# Patient Record
Sex: FEMALE | Race: WHITE | NOT HISPANIC OR LATINO | Employment: FULL TIME | URBAN - METROPOLITAN AREA
[De-identification: names, ages, dates, MRNs, and addresses within clinical notes are randomized per-mention and may not be internally consistent; named-entity substitution may affect disease eponyms.]

---

## 2018-11-30 ENCOUNTER — OFFICE VISIT (OUTPATIENT)
Dept: URGENT CARE | Facility: URGENT CARE | Age: 23
End: 2018-11-30
Payer: COMMERCIAL

## 2018-11-30 VITALS
DIASTOLIC BLOOD PRESSURE: 62 MMHG | HEIGHT: 62 IN | SYSTOLIC BLOOD PRESSURE: 116 MMHG | WEIGHT: 135 LBS | BODY MASS INDEX: 24.84 KG/M2 | RESPIRATION RATE: 16 BRPM | TEMPERATURE: 98.3 F | HEART RATE: 88 BPM | OXYGEN SATURATION: 100 %

## 2018-11-30 DIAGNOSIS — T78.40XD ALLERGIC STATE, SUBSEQUENT ENCOUNTER: Primary | ICD-10-CM

## 2018-11-30 PROCEDURE — 99203 OFFICE O/P NEW LOW 30 MIN: CPT | Performed by: PEDIATRICS

## 2018-11-30 RX ORDER — ALBUTEROL SULFATE 90 UG/1
2 AEROSOL, METERED RESPIRATORY (INHALATION) EVERY 6 HOURS
COMMUNITY

## 2018-11-30 RX ORDER — FLUTICASONE PROPIONATE 50 MCG
1 SPRAY, SUSPENSION (ML) NASAL DAILY
COMMUNITY

## 2018-11-30 RX ORDER — AZELASTINE 1 MG/ML
1 SPRAY, METERED NASAL 2 TIMES DAILY
COMMUNITY

## 2018-11-30 RX ORDER — PREDNISONE 20 MG/1
40 TABLET ORAL DAILY
Qty: 6 TABLET | Refills: 0 | Status: SHIPPED | OUTPATIENT
Start: 2018-11-30 | End: 2018-12-03

## 2018-11-30 NOTE — MR AVS SNAPSHOT
"              After Visit Summary   2018    Dorita Proctor    MRN: [id number]           Patient Information     Date Of Birth          1995        Visit Information        Provider Department      2018 7:35 PM Skip Matthew MD [facility]        Today's Diagnoses     Allergic state, subsequent encounter    -  1       Follow-ups after your visit        Who to contact     If you have questions or need follow up information about today's clinic visit or your schedule please contact [facility] directly at [phone].  Normal or non-critical lab and imaging results will be communicated to you by TruQChart, letter or phone within 4 business days after the clinic has received the results. If you do not hear from us within 7 days, please contact the clinic through [employer] or phone. If you have a critical or abnormal lab result, we will notify you by phone as soon as possible.  Submit refill requests through [employer] or call your pharmacy and they will forward the refill request to us. Please allow 3 business days for your refill to be completed.          Additional Information About Your Visit        MyChart Information     [employer] lets you send messages to your doctor, view your test results, renew your prescriptions, schedule appointments and more. To sign up, go to [email or web address] . Click on \"Log in\" on the left side of the screen, which will take you to the Welcome page. Then click on \"Sign up Now\" on the right side of the page.     You will be asked to enter the access code listed below, as well as some personal information. Please follow the directions to create your username and password.     Your access code is: [email or web address]  Expires: 2019  8:52 PM     Your access code will  in 90 days. If you need help or a new code, please call your [place] clinic or [phone].        Care EveryWhere ID     This is your Care EveryWhere ID. This " "could be used by other organizations to access your [facility] medical records  [id number]        Your Vitals Were     Pulse Temperature Respirations Height Pulse Oximetry BMI (Body Mass Index)    88 98.3  F (36.8  C) (Oral) 16 5' 2\" (1.575 m) 100% 24.69 kg/m2       Blood Pressure from Last 3 Encounters:   11/30/18 116/62    Weight from Last 3 Encounters:   11/30/18 135 lb (61.2 kg)              Today, you had the following     No orders found for display         Today's Medication Changes          These changes are accurate as of 11/30/18  8:52 PM.  If you have any questions, ask your nurse or doctor.               Start taking these medicines.        Dose/Directions    predniSONE 20 MG tablet   Commonly known as:  DELTASONE   Used for:  Allergic state, subsequent encounter   Started by:  Skip Matthew MD        Dose:  40 mg   Take 2 tablets (40 mg) by mouth daily for 3 days   Quantity:  6 tablet   Refills:  0       ranitidine 150 MG tablet   Commonly known as:  ZANTAC   Used for:  Allergic state, subsequent encounter   Started by:  Skip Matthew MD        Dose:  150 mg   Take 1 tablet (150 mg) by mouth 2 times daily   Quantity:  60 tablet   Refills:  0            Where to get your medicines      These medications were sent to [facility] [address] [place] [id number]     Phone:  [phone]     predniSONE 20 MG tablet    ranitidine 150 MG tablet                Primary Care Provider Fax #    Physician No Ref-Primary [phone]       No address on file        Equal Access to Services     [facility] CHER AH: Hadii ever murrayo Soyennyali, waaxda luqadaha, qaybta kaalmada adeegyada, maribell coello. So [facility] [phone].    ATENCIÓN: Si habla español, tiene a benson disposición servicios gratuitos de asistencia lingüística. Llame al [phone].    We comply with applicable federal civil rights laws and [place] laws. We do " not discriminate on the basis of race, color, national origin, age, disability, sex, sexual orientation, or gender identity.            Thank you!     Thank you for choosing [facility]  for your care. Our goal is always to provide you with excellent care. Hearing back from our patients is one way we can continue to improve our services. Please take a few minutes to complete the written survey that you may receive in the mail after your visit with us. Thank you!             Your Updated Medication List - Protect others around you: Learn how to safely use, store and throw away your medicines at www.disposemymeds.org.          This list is accurate as of 11/30/18  8:52 PM.  Always use your most recent med list.                   Brand Name Dispense Instructions for use Diagnosis    albuterol 108 (90 Base) MCG/ACT inhaler    PROAIR HFA/PROVENTIL HFA/VENTOLIN HFA     Inhale 2 puffs into the lungs every 6 hours        azelastine 0.1 % nasal spray    ASTELIN     Spray 1 spray into both nostrils 2 times daily        fluticasone 50 MCG/ACT nasal spray    FLONASE     Spray 1 spray into both nostrils daily        predniSONE 20 MG tablet    DELTASONE    6 tablet    Take 2 tablets (40 mg) by mouth daily for 3 days    Allergic state, subsequent encounter       ranitidine 150 MG tablet    ZANTAC    60 tablet    Take 1 tablet (150 mg) by mouth 2 times daily    Allergic state, subsequent encounter       SINGULAIR PO           VITAMIN D (CHOLECALCIFEROL) PO      Take by mouth daily

## 2018-12-01 NOTE — NURSING NOTE
Gave Benadrylk 25 mg po at 7:55PM per verbal order Dr. Matthew (pt had already taken 25 mg at 7PM). - Cait Siegel RN

## 2018-12-01 NOTE — PROGRESS NOTES
"SUBJECTIVE:  Dorita Proctor is a 23 year old female who presents to the clinic today for a rash.  Onset of rash was 1 hour(s) ago.   Rash is sudden onset.  Location of the rash: generalized.  Quality/symptoms of rash: itching and red   Symptoms are severe and rash seems to be worsening.  Previous history of a similar rash? Yes: history of allergic reactoins  Recent exposure history: food from nearby restaurant    Associated symptoms include: wheezing, shortness of breath and nausea.    No past medical history on file.  Current Outpatient Prescriptions   Medication Sig Dispense Refill     albuterol (PROAIR HFA/PROVENTIL HFA/VENTOLIN HFA) 108 (90 Base) MCG/ACT inhaler Inhale 2 puffs into the lungs every 6 hours       azelastine (ASTELIN) 0.1 % nasal spray Spray 1 spray into both nostrils 2 times daily       fluticasone (FLONASE) 50 MCG/ACT nasal spray Spray 1 spray into both nostrils daily       Montelukast Sodium (SINGULAIR PO)        predniSONE (DELTASONE) 20 MG tablet Take 2 tablets (40 mg) by mouth daily for 3 days 6 tablet 0     ranitidine (ZANTAC) 150 MG tablet Take 1 tablet (150 mg) by mouth 2 times daily 60 tablet 0     VITAMIN D, CHOLECALCIFEROL, PO Take by mouth daily       Social History   Substance Use Topics     Smoking status: Never Smoker     Smokeless tobacco: Never Used     Alcohol use Not on file       ROS:  CONSTITUTIONAL:NEGATIVE for fever, chills, change in weight  EYES: NEGATIVE for vision changes or irritation  CV: NEGATIVE for chest pain, palpitations or peripheral edema  MUSCULOSKELETAL: NEGATIVE for significant arthralgias or myalgia  NEURO: NEGATIVE for weakness, dizziness or paresthesias    EXAM:   /62  Pulse 88  Temp 98.3  F (36.8  C) (Oral)  Resp 16  Ht 5' 2\" (1.575 m)  Wt 135 lb (61.2 kg)  SpO2 100%  BMI 24.69 kg/m2  GENERAL: alert, no acute distress.  SKIN: Rash description:    Distribution: generalized  Location: generalized    Color: red,  Lesion type: wheals, blotchy with " warmth and excoriation  GENERAL APPEARANCE: healthy, alert and no distress  EYES: EOMI,  PERRL, conjunctiva clear  NECK: supple, non-tender to palpation, no adenopathy noted  RESP: lungs clear to auscultation - no rales, rhonchi or wheezes  CV: regular rates and rhythm, normal S1 S2, no murmur noted  NEURO: mild fine shaking, generalized    ASSESSMENT:  Hives, allergic reaction    PLAN:  Benadryl and prednisone given in clinic. Prescribed ranitidine and prednisone. Counseled on when to use the Epi pen.

## 2018-12-05 ENCOUNTER — RECORDS - HEALTHEAST (OUTPATIENT)
Dept: ADMINISTRATIVE | Facility: OTHER | Age: 23
End: 2018-12-05

## 2019-04-19 ENCOUNTER — RECORDS - HEALTHEAST (OUTPATIENT)
Dept: ADMINISTRATIVE | Facility: OTHER | Age: 24
End: 2019-04-19

## 2020-04-17 ENCOUNTER — RECORDS - HEALTHEAST (OUTPATIENT)
Dept: ADMINISTRATIVE | Facility: OTHER | Age: 25
End: 2020-04-17

## 2020-10-27 ENCOUNTER — COMMUNICATION - HEALTHEAST (OUTPATIENT)
Dept: SCHEDULING | Facility: CLINIC | Age: 25
End: 2020-10-27

## 2020-10-27 ENCOUNTER — OFFICE VISIT - HEALTHEAST (OUTPATIENT)
Dept: FAMILY MEDICINE | Facility: CLINIC | Age: 25
End: 2020-10-27

## 2020-10-27 DIAGNOSIS — Z20.822 COVID-19 RULED OUT: ICD-10-CM

## 2020-10-30 ENCOUNTER — AMBULATORY - HEALTHEAST (OUTPATIENT)
Dept: FAMILY MEDICINE | Facility: CLINIC | Age: 25
End: 2020-10-30

## 2020-10-30 DIAGNOSIS — Z20.822 COVID-19 RULED OUT: ICD-10-CM

## 2020-11-01 ENCOUNTER — COMMUNICATION - HEALTHEAST (OUTPATIENT)
Dept: SCHEDULING | Facility: CLINIC | Age: 25
End: 2020-11-01

## 2021-02-05 ENCOUNTER — COMMUNICATION - HEALTHEAST (OUTPATIENT)
Dept: SCHEDULING | Facility: CLINIC | Age: 26
End: 2021-02-05

## 2021-03-12 ENCOUNTER — OFFICE VISIT - HEALTHEAST (OUTPATIENT)
Dept: FAMILY MEDICINE | Facility: CLINIC | Age: 26
End: 2021-03-12

## 2021-03-12 DIAGNOSIS — Z12.4 SCREENING FOR CERVICAL CANCER: ICD-10-CM

## 2021-03-12 DIAGNOSIS — Z13.9 SCREENING FOR CONDITION: ICD-10-CM

## 2021-03-12 DIAGNOSIS — R35.0 URINARY FREQUENCY: ICD-10-CM

## 2021-03-12 DIAGNOSIS — J45.40 MODERATE PERSISTENT ASTHMA WITHOUT COMPLICATION: ICD-10-CM

## 2021-03-12 DIAGNOSIS — Z00.00 ROUTINE GENERAL MEDICAL EXAMINATION AT A HEALTH CARE FACILITY: ICD-10-CM

## 2021-03-12 DIAGNOSIS — Z76.89 ENCOUNTER TO ESTABLISH CARE: ICD-10-CM

## 2021-03-12 LAB
ALBUMIN UR-MCNC: NEGATIVE G/DL
APPEARANCE UR: CLEAR
BACTERIA #/AREA URNS HPF: ABNORMAL /[HPF]
BILIRUB UR QL STRIP: NEGATIVE
CHOLEST SERPL-MCNC: 182 MG/DL
COLOR UR AUTO: YELLOW
ERYTHROCYTE [DISTWIDTH] IN BLOOD BY AUTOMATED COUNT: 13 % (ref 11–14.5)
FASTING STATUS PATIENT QL REPORTED: NO
GLUCOSE UR STRIP-MCNC: NEGATIVE MG/DL
HBA1C MFR BLD: 5.3 %
HCT VFR BLD AUTO: 38.6 % (ref 35–47)
HDLC SERPL-MCNC: 73 MG/DL
HGB BLD-MCNC: 12.6 G/DL (ref 12–16)
HGB UR QL STRIP: ABNORMAL
HIV 1+2 AB+HIV1 P24 AG SERPL QL IA: NEGATIVE
KETONES UR STRIP-MCNC: NEGATIVE MG/DL
LDLC SERPL CALC-MCNC: 99 MG/DL
LEUKOCYTE ESTERASE UR QL STRIP: NEGATIVE
MCH RBC QN AUTO: 26.6 PG (ref 27–34)
MCHC RBC AUTO-ENTMCNC: 32.6 G/DL (ref 32–36)
MCV RBC AUTO: 81 FL (ref 80–100)
NITRATE UR QL: NEGATIVE
PH UR STRIP: 6 [PH] (ref 5–8)
PLATELET # BLD AUTO: 273 THOU/UL (ref 140–440)
PMV BLD AUTO: 8.2 FL (ref 7–10)
RBC # BLD AUTO: 4.74 MILL/UL (ref 3.8–5.4)
RBC #/AREA URNS AUTO: ABNORMAL HPF
SP GR UR STRIP: 1.01 (ref 1–1.03)
SQUAMOUS #/AREA URNS AUTO: ABNORMAL LPF
TRIGL SERPL-MCNC: 51 MG/DL
UROBILINOGEN UR STRIP-ACNC: ABNORMAL
WBC #/AREA URNS AUTO: ABNORMAL HPF
WBC: 6.9 THOU/UL (ref 4–11)

## 2021-03-12 ASSESSMENT — MIFFLIN-ST. JEOR: SCORE: 1297

## 2021-03-15 LAB
BKR LAB AP ABNORMAL BLEEDING: NO
BKR LAB AP BIRTH CONTROL/HORMONES: NORMAL
BKR LAB AP CERVICAL APPEARANCE: NORMAL
BKR LAB AP GYN ADEQUACY: NORMAL
BKR LAB AP GYN INTERPRETATION: NORMAL
BKR LAB AP HPV REFLEX: NORMAL
BKR LAB AP LMP: NORMAL
BKR LAB AP PATIENT STATUS: NORMAL
BKR LAB AP PREVIOUS ABNORMAL: NORMAL
BKR LAB AP PREVIOUS NORMAL: NORMAL
HCV AB SERPL QL IA: NEGATIVE
HIGH RISK?: NO
PATH REPORT.COMMENTS IMP SPEC: NORMAL
RESULT FLAG (HE HISTORICAL CONVERSION): NORMAL

## 2021-03-16 LAB
C TRACH DNA SPEC QL PROBE+SIG AMP: NEGATIVE
N GONORRHOEA DNA SPEC QL NAA+PROBE: NEGATIVE

## 2021-04-19 ENCOUNTER — RECORDS - HEALTHEAST (OUTPATIENT)
Dept: ADMINISTRATIVE | Facility: OTHER | Age: 26
End: 2021-04-19

## 2021-04-20 ENCOUNTER — COMMUNICATION - HEALTHEAST (OUTPATIENT)
Dept: SCHEDULING | Facility: CLINIC | Age: 26
End: 2021-04-20

## 2021-04-30 ENCOUNTER — OFFICE VISIT - HEALTHEAST (OUTPATIENT)
Dept: FAMILY MEDICINE | Facility: CLINIC | Age: 26
End: 2021-04-30

## 2021-04-30 ENCOUNTER — RECORDS - HEALTHEAST (OUTPATIENT)
Dept: GENERAL RADIOLOGY | Facility: CLINIC | Age: 26
End: 2021-04-30

## 2021-04-30 DIAGNOSIS — M79.644 THUMB PAIN, RIGHT: ICD-10-CM

## 2021-04-30 DIAGNOSIS — M79.644 PAIN IN RIGHT FINGER(S): ICD-10-CM

## 2021-04-30 DIAGNOSIS — Z30.46 ENCOUNTER FOR REMOVAL AND REINSERTION OF NEXPLANON: ICD-10-CM

## 2021-04-30 LAB — HCG UR QL: NEGATIVE

## 2021-05-20 ENCOUNTER — AMBULATORY - HEALTHEAST (OUTPATIENT)
Dept: NURSING | Facility: CLINIC | Age: 26
End: 2021-05-20

## 2021-05-20 DIAGNOSIS — Z00.00 ROUTINE GENERAL MEDICAL EXAMINATION AT A HEALTH CARE FACILITY: ICD-10-CM

## 2021-05-28 ASSESSMENT — ASTHMA QUESTIONNAIRES: ACT_TOTALSCORE: 21

## 2021-06-05 VITALS
HEART RATE: 72 BPM | WEIGHT: 133.9 LBS | BODY MASS INDEX: 24.49 KG/M2 | SYSTOLIC BLOOD PRESSURE: 102 MMHG | DIASTOLIC BLOOD PRESSURE: 78 MMHG

## 2021-06-05 VITALS
HEIGHT: 62 IN | SYSTOLIC BLOOD PRESSURE: 122 MMHG | HEART RATE: 64 BPM | BODY MASS INDEX: 24.29 KG/M2 | DIASTOLIC BLOOD PRESSURE: 82 MMHG | WEIGHT: 132 LBS

## 2021-06-12 NOTE — TELEPHONE ENCOUNTER
FNA triage call : Hx of Asthma .   Presenting problem :  Pt called. Exposure to covid last on 10/23/20 and no SXs or travel or testing yet.  Currently : 1&0, eating and activity are fine .   Guideline used : Covid suspected  A AH . New sore throat , runny nose and headache that started <24 hours ago and exposure on 10/23/20 .   Disposition and recommendations : Pt instruction  On self isolation and transferred to  for phone appt for covid test and starting mychart [facility] .  Caller verbalizes understanding and denies further questions and will call back if further symptoms to triage or questions  . Rena Jay RN  - [place] Nurse Advisor     Reason for Disposition    HIGH RISK patient (e.g., age > 64 years, diabetes, heart or lung disease, weak immune system) (Exception: Has already been evaluated by healthcare provider and has no new or worsening symptoms)    Additional Information    Negative: SEVERE difficulty breathing (e.g., struggling for each breath, speaks in single words)    Negative: Difficult to awaken or acting confused (e.g., disoriented, slurred speech)    Negative: Bluish (or gray) lips or face now    Negative: Shock suspected (e.g., cold/pale/clammy skin, too weak to stand, low BP, rapid pulse)    Negative: Sounds like a life-threatening emergency to the triager    Negative: [1] COVID-19 exposure AND [2] no symptoms    Negative: COVID-19 and Breastfeeding, questions about    Negative: [1] Adult with possible COVID-19 symptoms AND [2] triager concerned about severity of symptoms or other causes    Negative: SEVERE or constant chest pain or pressure (Exception: mild central chest pain, present only when coughing)    Negative: MODERATE difficulty breathing (e.g., speaks in phrases, SOB even at rest, pulse 100-120)    Negative: Patient sounds very sick or weak to the triager    Negative: MILD difficulty breathing (e.g., minimal/no SOB at rest, SOB with walking, pulse <100)    Negative:  Chest pain or pressure    Negative: Fever > 103 F (39.4 C)    Negative: [1] Fever > 101 F (38.3 C) AND [2] age > 60    Negative: [1] Fever > 100.0 F (37.8 C) AND [2] bedridden (e.g., nursing home patient, CVA, chronic illness, recovering from surgery)    [facility] Specific Disposition - [facility] Specific Patient Instructions  COVID 19 Nurse Triage Plan/Patient Instructions    Please be aware that novel coronavirus (COVID-19) may be circulating in the community. If you develop symptoms such as fever, cough, or SOB or if you have concerns about the presence of another infection including coronavirus (COVID-19), please contact your health care provider or visit [email or web address].       Home care recommended. Follow home care protocol based instructions. and Virtual Visit with provider recommended. Reference Visit Selection Guide.    Thank you for taking steps to prevent the spread of this virus.  Limit your contact with others.  Wear a simple mask to cover your cough.  Wash your hands well and often.    Resources  [facility]: About COVID-19: [email or web address]  CDC: What to Do If You're Sick: www.cdc.gov/coronavirus/2019-ncov/about/steps-when-sick.html  CDC: Ending Home Isolation: www.cdc.gov/coronavirus/2019-ncov/hcp/disposition-in-home-patients.html   CDC: Caring for Someone: www.cdc.gov/coronavirus/2019-ncov/if-you-are-sick/care-for-someone.html   [facility]: Interim Guidance for Hospital Discharge to Home: www.health.[facility].[place]/diseases/coronavirus/hcp/hospdischarge.pdf  [facility] clinical trials (COVID-19 research studies): clinicalaffairs.[facility]/um-clinical-trials   Below are the COVID-19 hotlines at the [place] Department of Health ([facility]). Interpreters are available.   For health questions: Call 453-590-5778 or 1-955.954.7108 (7 a.m. to 7 p.m.)  For questions about schools and childcare: Call 231-979-8046 or 1-427.268.6208 (7 a.m. to 7 p.m.)    Protocols used:  CORONAVIRUS (COVID-19) DIAGNOSED OR [id number] 8.4.20

## 2021-06-12 NOTE — PROGRESS NOTES
"Dorita Proctor is a 25 y.o. female who is being evaluated via a billable telephone visit.      The patient has been notified of following:     \"This telephone visit will be conducted via a call between you and your physician/provider. We have found that certain health care needs can be provided without the need for a physical exam.  This service lets us provide the care you need with a short phone conversation.  If a prescription is necessary we can send it directly to your pharmacy.  If lab work is needed we can place an order for that and you can then stop by our lab to have the test done at a later time.    Telephone visits are billed at different rates depending on your insurance coverage. During this emergency period, for some insurers they may be billed the same as an in-person visit.  Please reach out to your insurance provider with any questions.    If during the course of the call the physician/provider feels a telephone visit is not appropriate, you will not be charged for this service.\"    Patient has given verbal consent to a Telephone visit? Yes    What phone number would you like to be contacted at? [phone]    Patient would like to receive their AVS by AVS Preference: Paty.    Additional provider notes:     Assessment/Plan:  1. COVID-19 ruled out  Discussed possible diagnosis including viral upper respiratory infection, influenza and COVID-19. Recommend testing for COVID.-19  - Symptomatic COVID-19 Virus (CORONAVIRUS) PCR; Future    Subjective:  Dorita Proctor, 25 years old female who is concerned with possible COVID-19 exposure. 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 Patient denied chest pain, shortness of breath, fever and chills.     Phone call duration:  11 minutes        "

## 2021-06-15 NOTE — TELEPHONE ENCOUNTER
Would like to see Dr Martins. Was a patient of her 4/5 years ago. Has not been seen since. Would like a physical and have IUD replaced. Please call. Ok to leave detailed message.

## 2021-06-15 NOTE — PATIENT INSTRUCTIONS - HE
Contraception options:  For more good quality evidence based review of your options, consider watching the Dr. Ivone Sidhu youtube videos for more information       TMJ exercises based on the American Academy of [employer] Physicians article but this one has videos for best learning!    https://www.[employer]/health/tmj-exercises      Consider looking into a low FODMAP diet approach to identify triggers for your symptoms and eliminate these from your diet in a structured, specific way.     https://www.[employer]/

## 2021-06-16 NOTE — TELEPHONE ENCOUNTER
Pt returned call and confirmed that her upcoming appt was for nexplanon removal and reinsertion.      Kenzie Cardona

## 2021-06-16 NOTE — TELEPHONE ENCOUNTER
New Appointment Needed  What is the reason for the visit:    would like to know if PCP can look at thumb while at next appointment when she comes in for IUD removal / reinsert  Provider Preference: PCP  How soon do you need to be seen?: appt on 4/30  Waitlist offered?: No  Okay to leave a detailed message:  Yes

## 2021-06-16 NOTE — TELEPHONE ENCOUNTER
Lm notifying patient of below message of to confirm her appointment was for nexplanon removal and reinsertion.    Petra SRIVASTAVA CMA ([facility])

## 2021-06-16 NOTE — TELEPHONE ENCOUNTER
We can probably help with that if timing permits- just to confirm her appointment was for a nexplanon removal and reinsertion

## 2021-06-17 NOTE — PROGRESS NOTES
Procedure Note-Nexplanon Removal and Reinsertion  Meaghan Davis is a patient of Dr. Geovanna Benton MD here for removal of etonogestrel implant Nexplanon/Implanon.  Indication: desires removal of expiring nexplanon and replacement with new nexplanon for effective birth control  Counseling: Pt counseled on potential side effects of Nexplanon, including unpredictable spotting/bleeding and plan for removal/replacement of Nexplanon in 3 years or less.   Labs: UPT negative  LMP: Patient's last menstrual period was 04/05/2021 (approximate).  Consent: Affirmation of informed consent was signed and scanned into the medical record. Risks, benefits and alternatives were discussed. Patient's questions were elicited and answered.   Procedure safety checklist was completed: Yes  Time Out (Pause for the Cause) completed: Yes  Preoperative Diagnosis: etonogestrel implant  Postoperative Diagnosis: etonogestrel implant removed and reinserted new etonogestrel implant    Removal Procedure: Patient was placed supine with LEFT arm exposed.  Arm was prepped with betadine x3. Length of device was anesthetized with 3 mL of 2% lidocaine with epinephrine.  Small incision (<5mm) was made at distal end of palpable implant, curved hemostat was used to isolate the implant and bring it to the incision, the fibrous capsule containing the implant was incised and the Implant was removed intact.  Reinsertion Procedure: Using original site for insertion site: After stretching the skin with thumb and index finger around the insertion site, skin was punctured with the tip of the needle was inserted at 30 degrees and then lowered to horizontal position. While lifting the skin with the tip of the needle, the needle with inserted to its full length. Applicator was stabilized and purple slider was released down. Applicator was then removed. Correct placement of the implant was confirmed by palpation in the patient's arm by myself and by the patient.  Insertion site was dressed with a pressure dressing.   User card and patient chart label filled out. User card given to patient. Nexplanon added to medication list.  NDC# [ 8970-5275-38 ]  Lot# [ [id number] ]   EXP [id number]    Post op cares:   EBL: minimal  Complications: No  Tolerance: Pt tolerated procedure well and was in stable condition.   Contraception was discussed and patient chose the following method: Nexplanon reinsertion   Follow up: Pt was instructed to call if bleeding, severe pain or foul smell.   Geovanna Benton MD        A/P    Nexplanon removal/reinsertion  See notes above    Right thumb pain, s/p rock climbing injury  Right thumb, dominant side: She was rock climbing 3 weeks ago and smacked her thumb inward down against a rock. Very swollen for 3 days, iced and ibuprofen helped by about day 4. Wore a splint for 1.5 weeks. No particular triggers for the pain; but touching it hurts joão at the PIP. Worse after climbing.      Decreased range of motion.  Xray today reviewed personally shows no evidence of fracture, dislocation or effusion. Advised physical therapy, and referral to Ortho if not improving.  Thanks,   Dr. Benton

## 2021-06-21 NOTE — LETTER
Letter by Geovanna Benton MD at      Author: Geovanna Benton MD Service: -- Author Type: --    Filed:  Encounter Date: 3/12/2021 Status: (Other)       My Asthma Action Plan    Name: Dorita Proctor   YOB: 1995  Date: 3/12/2021   My doctor: Geovanna Benton MD   My clinic: [facility]        My Control Medicine: Budesonide + formoterol (Symbicort HFA) -  80/4.5 mcg 2 puffs twice daily  My Rescue Medicine: Albuterol (Proair/Ventolin/Proventil HFA) 2-4 puffs EVERY 4 HOURS as needed. Use a spacer if recommended by your provider.    My Asthma Severity:   Moderate Persistent  Know your asthma triggers: upper respiratory infections, dust mites, pollens, animal dander, exercise or sports, cold air and multiple triggers               GREEN ZONE   Good Control    I feel good    No cough or wheeze    Can work, sleep and play without asthma symptoms     Take your asthma control medicine every day.     1. If exercise triggers your asthma, take your rescue medication    15 minutes before exercise or sports, and    During exercise if you have asthma symptoms  2. Spacer to use with inhaler: If you have a spacer, make sure to use it with your inhaler             YELLOW ZONE Getting Worse  I have ANY of these:    I do not feel good    Cough or wheeze    Chest feels tight    Wake up at night 1. Keep taking your Green Zone medications  2. Start taking your rescue medicine:    every 20 minutes for up to 1 hour. Then every 4 hours for 24-48 hours.  3. If you stay in the Yellow Zone for more than 12-24 hours, contact your doctor.  4. If you do not return to the Green Zone in 12-24 hours or you get worse, start taking your oral steroid medicine if prescribed by your provider.           RED ZONE Medical Alert - Get Help  I have ANY of these:    I feel awful    Medicine is not helping    Breathing getting harder    Trouble walking or talking    Nose opens wide to breathe      1. Take your rescue medicine NOW  2. If your provider has prescribed an oral steroid medicine, start taking it NOW  3. Call your doctor NOW  4. If you are still in the Red Zone after 20 minutes and you have not reached your doctor:    Take your rescue medicine again and    Call 911 or go to the emergency room right away    See your regular doctor within 2 weeks of an Emergency Room or Urgent Care visit for follow-up treatment.          Annual Reminders:  Meet with Asthma Educator,  Flu Shot in the Fall, consider Pneumonia Vaccination for patients with asthma (aged 19 and older).    Pharmacy:   [id number] [address] AT [facility] [place], [place] [address]  [address] [facility] [address]  Phone: [phone] Fax: [phone]      Electronically signed by Geovanna Benton MD   Date: 03/12/21                    Asthma Triggers  How To Control Things That Make Your Asthma Worse    Triggers are things that make your asthma worse.  Look at the list below to help you find your triggers and what you can do about them.  You can help prevent asthma flare-ups by staying away from your triggers.      Trigger                                                          What you can do   Cigarette Smoke  Tobacco smoke can make asthma worse. Do not allow smoking in your home, car or around you.  Be sure no one smokes at a [facility] day care or school.  If you smoke, ask your health care provider for ways to help you quit.  Ask family members to quit too.  Ask your health care provider for a referral to Quit Plan to help you quit smoking, or call 8-142-925-PLAN.     Colds, Flu, Bronchitis  These are common triggers of asthma. Wash your hands often.  Dont touch your eyes, nose or mouth.  Get a flu shot every year.     Dust Mites  These are tiny bugs that live in cloth or carpet. They are too small to see. Wash sheets and blankets in hot water every week.   Encase pillows and mattress in dust mite proof  covers.  Avoid having carpet if you can. If you have carpet, vacuum weekly.   Use a dust mask and HEPA vacuum.   Pollen and Outdoor Mold  Some people are allergic to trees, grass, or weed pollen, or molds. Try to keep your windows closed.  Limit time out doors when pollen count is high.   Ask you health care provider about taking medicine during allergy season.     Animal Dander  Some people are allergic to skin flakes, urine or saliva from pets with fur or feathers. Keep pets with fur or feathers out of your home.    If you cant keep the pet outdoors, then keep the pet out of your bedroom.  Keep the bedroom door closed.  Keep pets off cloth furniture and away from stuffed toys.     Mice, Rats, and Cockroaches   Some people are allergic to the waste from these pests.   Cover food and garbage.  Clean up spills and food crumbs.  Store grease in the refrigerator.   Keep food out of the bedroom.   Indoor Mold  This can be a trigger if your home has high moisture. Fix leaking faucets, pipes, or other sources of water.   Clean moldy surfaces.  Dehumidify basement if it is damp and smelly.   Smoke, Strong Odors, and Sprays  These can reduce air quality. Stay away from strong odors and sprays, such as perfume, powder, hair spray, paints, smoke incense, paint, cleaning products, candles and new carpet.   Exercise or Sports  Some people with asthma have this trigger. Be active!  Ask your doctor about taking medicine before sports or exercise to prevent symptoms.    Warm up for 5-10 minutes before and after sports or exercise.     Other Triggers of Asthma  Cold air:  Cover your nose and mouth with a scarf.  Sometimes laughing or crying can be a trigger.  Some medicines and food can trigger asthma.

## 2021-06-30 NOTE — PROGRESS NOTES
Progress Notes by Geovanna Benton MD at 3/12/2021  4:00 PM     Author: Geovanna Benton MD Service: -- Author Type: Physician    Filed: 3/12/2021  5:26 PM Encounter Date: 3/12/2021 Status: Signed    : Geovanna Benton MD (Physician)       FEMALE PREVENTATIVE EXAM    Assessment and Plan:     Patient has been advised of split billing requirements and indicates understanding: Yes    Dorita was seen today for establish care, annual exam and urinary frequency.    Encounter to establish care  Medical hx reviewed, chart updated    Routine general medical examination at a health care facility  -     Glycosylated Hemoglobin A1c  -     Lipid Cascade  -     HM2(CBC w/o Differential)  -     HPV vaccine 9 valent 3 dose IM; Standing  -     Td, Preservative Free (green label)  -     Pneumococcal polysaccharide vaccine 23-valent 1 yo or older, subq/IM; Future  -     Chlamydia trachomatis & Neisseria gonorrhoeae, Amplified Detection  -     HIV Antigen/Antibody Screening Cascade  -     Hepatitis C Antibody (Anti-HCV)    Urinary frequency  Suspect possibly related to PO fluid intake, but will screen for UTI and diabetes (of note, she will likely have RBCs noted as pap was collected today prior to sample and blood noted after sampling cervix as expected with friable ectocervix)    -     Urinalysis-UC if Indicated  -     Glycosylated Hemoglobin A1c    Screening for cervical cancer  Due for first pap  -     Gynecologic Cytology (PAP Smear)    Screening for condition  -     Chlamydia trachomatis & Neisseria gonorrhoeae, Amplified Detection  -     HIV Antigen/Antibody Screening Cascade  -     Hepatitis C Antibody (Anti-HCV)    Hx of campylobacter   Residual GI sx of bloating; lots of allergies and possible food sensitivities; advised to return for further eval otherwise can consider low FODMAP diet and this was reviewed      Asthma, allergies  Following with allergist; immunotherapy. AAP and ACT updated today.           30 of the 43 minutes on the date of the encounter was spent outside of preventative health care with regard to doing chart review, patient visit and documentation of other pertinent medical conditions or history.      Next follow up:  Return in about 1 year (around 3/12/2022) for Annual physical, anytime for nexplanon removal/replacement; return to discuss other symptoms.    Immunization Review  Adult Imm Review: Due today, orders placed    I discussed the following with the patient:   Adult Healthy Living: Importance of regular exercise  Healthy nutrition  Getting adequate sleep  Stress management  STI prevention  Contraception options  Supplement use    Subjective:   Chief Complaint: Dorita Proctor is an 25 y.o. female here for a preventative health visit.    Patient has been advised of split billing requirements and indicates understanding: Yes  HPI:    Chief Complaint   Patient presents with   ? Establish Care   ? Annual Exam     not fasting   ? Urinary Frequency     last two weeks, no sx other than the frequency       Currently [facility] She was called at the [facility] year [facility]ura and told it lasts 5 years.   She had issues with initial placement: no period for 3 months, then continuous bleeding for 1 month, and again 2 month; then it settled out.    She has some urinary frequency in the past 2 weeks; no pain, no blood. She does have a hx of UTIs in her youth; hard to tell if this is similar.    Hx of asthma and severe allergies. She follows with an allergist for significant hx of food and pollen allergies. She does get immunotherapy.   Has her annual visit is tomorrow    She has hx of champlyobacter GI issues in highschool; still some bloating and cramping.       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     Dating Adrian- 6 years. No smoking;  "rare alcohol in the past 2 years (allergic reaction).    Geovanna Benton MD       Healthy Habits  Are you taking a daily aspirin? No  Do you typically exercising at least 40 min, 3-4 times per week?  Yes  Do you usually eat at least 4 servings of fruit and vegetables a day, include whole grains and fiber and avoid regularly eating high fat foods? Yes  Have you had an eye exam in the past two years? NO  Do you see a dentist twice per year? NO  Do you have any concerns regarding sleep? No    Safety Screen  If you own firearms, are they secured in a locked gun cabinet or with trigger locks? The patient does not own any firearms  Do you feel you are safe where you are living?: Yes (3/12/2021  3:57 PM)  Do you feel you are safe in your relationship(s)?: Yes (3/12/2021  3:57 PM)      Review of Systems:  Please see above.  The rest of the review of systems are negative for all systems.     Pap History:   hasn't had a first pap yet  Cancer Screening       Status Date      PAP SMEAR Overdue 7/21/2016           Patient Care Team:  Geovanna Benton MD as PCP - General (Family Medicine)        History     Reviewed By Date/Time Sections Reviewed    Geovanna Benton MD 3/12/2021  4:24 PM Medical, Surgical, Tobacco, Family    Geovanna Benton MD 3/12/2021  4:14 PM Social Documentation    Courtney Truong LPN 3/12/2021  3:56 PM Tobacco            Objective:   Vital Signs:   Visit Vitals  /82 (Patient Site: Left Arm, Patient Position: Sitting, Cuff Size: Adult Regular)   Pulse 64   Ht 5' 2\" (1.575 m)   Wt 132 lb (59.9 kg)   LMP 02/27/2021 (Approximate)   BMI 24.14 kg/m           PHYSICAL EXAM  Constitutional: Patient is oriented to person, place, and time. Patient appears well-developed and well-nourished. No distress.   Head: Normocephalic and atraumatic.   Ears: External ear and TMs normal bilaterally.  Nose: Nose normal.   Mouth/Throat: Oropharynx is clear and moist. No oropharyngeal exudate.   Eyes: " Conjunctivae and EOM are normal. Pupils are equal, round, and reactive to light. No discharge. No scleral icterus.   Neck: Neck supple. No JVD present. No tracheal deviation present. No thyromegaly present.  Breasts: not indicated based on USPSTF recommendations for asymptomatic women  Cardiovascular: Normal rate, regular rhythm, normal heart sounds and intact distal pulses. No murmur heard.   Pulmonary/Chest: Effort normal and breath sounds normal. Patient has no wheezes, no rales, exhibits no tenderness.   Abdominal: Soft. Bowel sounds are normal. No masses. There is no tenderness.   Lymphadenopathy:  Patient has no cervical adenopathy.   Neurological: Patient is alert and oriented to person, place, and time. Patient has normal reflexes. No cranial nerve deficit. Coordination normal.   Skin: Skin is warm and dry. No rash noted. No pallor.   Pelvic: Normal external genitalia with Normal vulva.  Normal vagina with no polyps or lesions and with physiologic discharge.  Normal cervix with normal mucosa and without CMT.  No adnexal masses. Friable cervix  Psychiatric: Patient has good eye contact without any psychomotor retardation or stereotypic behaviors.  normal mood and affect. Judgment and thought content normal.   Speech is regular rate and rhythm.              Medication List          Accurate as of March 12, 2021  5:26 PM. If you have any questions, ask your nurse or doctor.            CONTINUE taking these medications    ALBUTEROL (REFILL) INHL  INSTRUCTIONS: Inhale.        azelastine 137 mcg (0.1 %) nasal spray  Also known as: ASTELIN  INSTRUCTIONS: INHALE 2 SPRAYS NASALLY TWICE DAILY        budesonide-formoteroL 80-4.5 mcg/actuation inhaler  Also known as: SYMBICORT  INSTRUCTIONS: Inhale 2 puffs 2 (two) times a day.        cholecalciferol (vitamin D3) 5,000 unit Tab  INSTRUCTIONS: Take by mouth.        EPINEPHrine 0.3 mg/0.3 mL injection  Also known as: EpiPen  INSTRUCTIONS: Inject 0.3 mL (0.3 mg total) into  the shoulder, thigh, or buttocks as needed for anaphylaxis Inject into thigh..  Doctor's comments: Please substitute with cheapest option        fexofenadine-pseudoephedrine 180-240 mg per 24 hr tablet  Also known as: ALLEGRA-D 24  INSTRUCTIONS: Take 1 tablet by mouth daily.        fluticasone propionate 50 mcg/actuation nasal spray  Also known as: FLONASE        ibuprofen 600 MG tablet  Also known as: ADVIL,MOTRIN        montelukast 10 mg tablet  Also known as: SINGULAIR           STOP taking these medications    levonorgestrel-ethinyl estradiol 0.15-0.03 mg per tablet  Also known as: NORDETTE  Stopped by: Geovanna Benton MD            Additional Screenings Completed Today:   In the past 4 weeks, how much of the time did your asthma keep you from getting as much done at work, school, or at home?: None of the time  During the past 4 weeks, how often have you had shortness of breath?: Once or twice a week  During the past 4 weeks, how often did your asthma symptoms (wheezing, coughing, shortness of breath, chest tightness or pain) wake you up at night or earlier in the morning?: Not at all  During the past 4 weeks, how often have you used your rescue inhaler or nebulizer medication (such as albuterol)?: 2 or 3 times per week  How would you rate your asthma control during the past 4 weeks?: Well controlled  ACT Total Score: 21  In the past 12 months, have you visited the emergency room due to your asthma?: No  In the past 12 months, have you been hospitalized due to your asthma?: No

## 2021-07-04 NOTE — ADDENDUM NOTE
Addendum Note by Geovanna Benton MD at 4/30/2021  4:00 PM     Author: Geovanna Benton MD Service: -- Author Type: Physician    Filed: 5/3/2021  9:37 PM Encounter Date: 4/30/2021 Status: Signed    : Geovanna Benton MD (Physician)    Addended by: GEOVANNA BENTON on: 5/3/2021 09:37 PM        Modules accepted: Orders, Level of Service

## 2021-07-30 ENCOUNTER — TRANSFERRED RECORDS (OUTPATIENT)
Dept: HEALTH INFORMATION MANAGEMENT | Facility: CLINIC | Age: 26
End: 2021-07-30

## 2021-08-26 ENCOUNTER — NURSE TRIAGE (OUTPATIENT)
Dept: NURSING | Facility: CLINIC | Age: 26
End: 2021-08-26

## 2021-08-26 NOTE — TELEPHONE ENCOUNTER
It would be pretty unlikely to be an allergic reaction since she has a prior nexplanon without issues for several years. She is welcome to schedule a visit to discuss further  Thanks,   Dr. Benton

## 2021-08-26 NOTE — TELEPHONE ENCOUNTER
Last night she thought she had a bug bite thing on insertion site of Nexplanon (which was inserted in April). White welt at site. Had it one month ago as well. Is it a reaction? It's a raised bump. Triage guideline doesn't go into possible reasons for this. Please call patient back and advise further:  [phone].  Thank you,  Adalgisa Mejia RN  [facility]      Reason for Disposition    Caller has NON-URGENT question and triager unable to answer question    Additional Information    Negative: Questions mainly about emergency contraception    Negative: Taking a combined birth control pill (BCP contains both an estrogen and progestin)    Negative: Abdominal pain and pregnant < 20 weeks    Negative: Vaginal bleeding and pregnant < 20 weeks    Negative: SEVERE abdominal pain (e.g., excruciating) and present > 1 hour    Negative: SEVERE vaginal bleeding (e.g., soaking 2 pads or tampons per hour) and present 2 or more hours    Negative: Patient sounds very sick or weak to the triager    Negative: MODERATE vaginal bleeding (e.g., soaking 1 pad or tampon per hour and present > 6 hours)    Negative: Constant abdominal pain and present > 2 hours    Negative: Needs refill of BCPs    Protocols used: CONTRACEPTION - BIRTH CONTROL PILLS - PROGESTIN-ONLY PILLS (POPS)-A-[place]

## 2021-08-27 NOTE — TELEPHONE ENCOUNTER
Relayed message to patient & patient stated she already called back & scheduled w/ PCP on 09/08/21.

## 2021-08-30 ENCOUNTER — NURSE TRIAGE (OUTPATIENT)
Dept: NURSING | Facility: CLINIC | Age: 26
End: 2021-08-30

## 2021-08-30 NOTE — TELEPHONE ENCOUNTER
Patient calling reporting upset stomach, intermittent diarrhea and cramping since 8/26. Reports mild intermittent abdominal cramping today rated 2/10 with no diarrhea present in 24 hours. Denies vaginal bleeding, severe pain and bloody stool. Patient also had questions regarding her nexplanon with all questions answered.     Livia Harley RN 08/30/21 4:43 PM    [facility] Nurse Advisor      Reason for Disposition    Birth Control Implants, questions about    MODERATE OR MILD pain that comes and goes (cramps) lasts > 24 hours    Additional Information    Negative: Abdominal pain and pregnant < 20 weeks    Negative: Vaginal bleeding and pregnant <  20 weeks    Negative: Vaginal discharge is main symptom    Negative: SEVERE abdominal pain (e.g., excruciating) and present > 1 hour    Negative: Implant site looks infected (spreading redness, red streak, or pus) and NO fever    Negative: Caller has URGENT question and triager unable to answer question    Negative: SEVERE vaginal bleeding (e.g., soaking 2 pads or tampons per hour) and present 2 or more hours    Negative: MODERATE vaginal bleeding (e.g., soaking 1 pad or tampon per hour and present > 6 hours)    Negative: Constant abdominal pain and present > 2 hours    Negative: Patient sounds very sick or weak to the triager    Negative: Implant looks like it is coming out    Negative: Pregnant    Negative: Vaginal bleeding with > 6 soaked pads or tampons per day and lasts > 7 days    Negative: Patient wants to be seen    Negative: Vaginal bleeding with > 6 soaked pads or tampons per day    Negative: Vaginal bleeding lasts > 7 days    Negative: Has an Implanon or Nexplanon implant and more than 3 years since it was placed    Negative: Has a Jadelle or Norplant implant and more than 5 years since it was placed    Negative: Has birth control implant AND can't feel implant under the skin    Negative: Wants to get birth control implant removed    Negative: Passed out  (i.e., fainted, collapsed and was not responding)    Negative: Shock suspected (e.g., cold/pale/clammy skin, too weak to stand, low BP, rapid pulse)    Negative: Sounds like a life-threatening emergency to the triager    Negative: SEVERE abdominal pain (e.g., excruciating)    Negative: Vomiting red blood or black (coffee ground) material    Negative: Bloody, black, or tarry bowel movements (Exception: chronic-unchanged black-grey bowel movements and is taking iron pills or Pepto-bismol)    Negative: Constant abdominal pain lasting > 2 hours    Negative: Vomiting bile (green color)    Negative: Patient sounds very sick or weak to the triager    Negative: White of the eyes have turned yellow (i.e., jaundice)    Negative: Vomiting and abdomen looks much more swollen than usual    Negative: Blood in urine (red, pink, or tea-colored)    Negative: Fever > 103 F (39.4 C)    Negative: Fever > 101 F (38.3 C) and over 60 years of age    Negative: Fever > 100.0 F (37.8 C) and has diabetes mellitus or a weak immune system (e.g., HIV positive, cancer chemotherapy, organ transplant, splenectomy, chronic steroids)    Negative: Fever > 100.0 F (37.8 C) and bedridden (e.g., nursing home patient, stroke, chronic illness, recovering from surgery)    Negative: Pregnant or could be pregnant (i.e., missed last menstrual period)    Protocols used: CONTRACEPTION - IMPLANT SYMPTOMS AND [id number]-C-YY, ABDOMINAL PAIN - FEMALE-A-[place]

## 2021-09-03 ENCOUNTER — OFFICE VISIT (OUTPATIENT)
Dept: FAMILY MEDICINE | Facility: CLINIC | Age: 26
End: 2021-09-03
Payer: COMMERCIAL

## 2021-09-03 VITALS
BODY MASS INDEX: 23.63 KG/M2 | HEART RATE: 70 BPM | DIASTOLIC BLOOD PRESSURE: 70 MMHG | SYSTOLIC BLOOD PRESSURE: 110 MMHG | WEIGHT: 129.2 LBS

## 2021-09-03 DIAGNOSIS — R19.7 DIARRHEA, UNSPECIFIED TYPE: ICD-10-CM

## 2021-09-03 DIAGNOSIS — Z30.09 COUNSELING FOR BIRTH CONTROL, ORAL CONTRACEPTIVES: ICD-10-CM

## 2021-09-03 DIAGNOSIS — Z30.46 ENCOUNTER FOR NEXPLANON REMOVAL: Primary | ICD-10-CM

## 2021-09-03 PROCEDURE — 83516 IMMUNOASSAY NONANTIBODY: CPT | Mod: 59 | Performed by: FAMILY MEDICINE

## 2021-09-03 PROCEDURE — 99214 OFFICE O/P EST MOD 30 MIN: CPT | Performed by: FAMILY MEDICINE

## 2021-09-03 PROCEDURE — 36415 COLL VENOUS BLD VENIPUNCTURE: CPT | Performed by: FAMILY MEDICINE

## 2021-09-03 RX ORDER — NORGESTIMATE AND ETHINYL ESTRADIOL 0.25-0.035
1 KIT ORAL DAILY
Qty: 84 TABLET | Refills: 3 | Status: SHIPPED | OUTPATIENT
Start: 2021-09-03 | End: 2022-11-04

## 2021-09-03 RX ORDER — BUDESONIDE AND FORMOTEROL FUMARATE DIHYDRATE 80; 4.5 UG/1; UG/1
2 AEROSOL RESPIRATORY (INHALATION)
COMMUNITY
End: 2023-11-30

## 2021-09-03 RX ORDER — FEXOFENADINE HCL AND PSEUDOEPHEDRINE HCL 180; 240 MG/1; MG/1
1 TABLET, EXTENDED RELEASE ORAL
COMMUNITY
End: 2021-09-30

## 2021-09-03 RX ORDER — EPINEPHRINE 0.3 MG/.3ML
0.3 INJECTION SUBCUTANEOUS
COMMUNITY
Start: 2021-07-30

## 2021-09-03 NOTE — PATIENT INSTRUCTIONS
Consider looking into a low FODMAP diet approach to identify triggers for your symptoms and eliminate these from your diet in a structured, specific way.     [email or web address]

## 2021-09-03 NOTE — PROGRESS NOTES
AP:  Encounter for removal  of Nexplanon  Concern for possible allergy to the etonogesterol device; developed two hives at it's site; though has tolerated this for 3yr previously; and prior to hives starting she did have an ER visit for anaphylactic reaction to fish or something else.    - nexplanon removed without complication; see procedure note below    Counseling for birth control, oral contraceptives  Reviewed range of contraceptive options available. Reviewed typical effectiveness of each as well as side effect profiles of these options, including effects on spotting, nausea/ headaches, and emotional lability as well as risk for DVT/PE.   - Pt elects for OCPs.   - Good candidate for this, with no h/o migraine w/ aura, liver or clotting or kidney issues. Nonsmoker.   - norgestimate-ethinyl estradiol (ORTHO-CYCLEN) 0.25-35 MG-MCG tablet; Take 1 tablet by mouth daily    Diarrhea, unspecified type  Discussed screening for celiac disease and low FODMAP diet; also monitor for sx improvement with the nexplnon removed. Hx of campylobacter several years ago; has seen GI for that in the past  - Tissue transglutaminase karina IgA and IgG; Future  - Tissue transglutaminase karina IgA and IgG      S : Dorita Proctor is a patient of Geovanna Bernabe here for removal of etonogestrel implant Nexplanon, discuss allergy, option for other OCPs and also GI symptoms.    Chief Complaint   Patient presents with     Follow Up     possible reaction to nexplanon, twice in August had a welt around site     Diarrhea     diarrhea, blotting and nasea most times after eatting     Nexplanon was placed in April, since then some itching at the insertion site. Then in August on two separate occasions got a large hive at the insertion site; used benadryl cream.   Interestingly she had a nexplanon before this for 3 whole years without any issues in terms of allergies but did have prolonged bleeding. She is still getting this issue with the  current nexplanon too.   Has been on a birth control pill in the past.   Of note on July 30th she had an allergic reactionto fish or something prior to that. She's not sure if it's all related or not.  Patient reporting upset stomach, intermittent diarrhea and cramping since 8/26. Reports mild intermittent abdominal cramping today rated 2/10 with no diarrhea present in 24 hours. Denies vaginal bleeding, severe pain and bloody stool.  She does have a hx of campylobacter and has seen GI for this in the past.  Eating bland foods for 1 week.  Stopped drinking coffee. Limiting dairy except yogurt.  Hasn't tried low fodmap diet yet.    She has a hx of several allergies (20; two were listed as corn oil and corn syrup however she eats these without issue and we removed today as they flagged as possible inactive ingredient in her OCP options.      O: /70   Pulse 70   Wt 58.6 kg (129 lb 3.2 oz)   LMP 08/04/2021 (Approximate)   BMI 23.63 kg/m    General: Alert, no acute distress.   HEENT: normocephalic conjunctivae are clear. EOMI  Neck: supple   Lungs: Normal effort  Heart: regular rate  Abdomen: soft   Skin: clear without rash or lesions; nexplanon easily palpated in left arm; no current hives  Neuro: alert, oriented  Psych: mood good, affect appropriate, good eye contact, insight and judgment intact, normal speech pattern.      NEXPLANON REMOVAL PROCEDURE NOTE  Indication:  Excessive bleeding and two episodes of hive by device site; concerning for allergic reaction  Consent: Affirmation of informed consent was signed and scanned into the medical record. Risks, benefits and alternatives were discussed. Patient's questions were elicited and answered.   Procedure safety checklist was completed: Yes  Time Out (Pause for the Cause) completed: Yes  Preoperative Diagnosis: etonogestrel implant  Postoperative Diagnosis: etonogestrel implant removed  Technique:   Skin prep Betadinex3  Anesthesia 1% Lidocaine with  epinephrine  Procedure: Small incision (<5mm) was made at distal end of palpable implant, curved hemostat was used to isolate the implant and bring it to the incision, the fibrous capsule containing the implant was incised and the Implant was removed intact.  EBL: minimal  Complications: Yes  Tolerance: Pt tolerated procedure well and was in stable condition.   Contraception was discussed and patient chose the following method oral contraceptives (estrogen/progesterone)  Follow up: Pt was instructed to call if bleeding, severe pain or foul smell.   Geovanna Benton MD

## 2021-09-04 ASSESSMENT — ASTHMA QUESTIONNAIRES: ACT_TOTALSCORE: 21

## 2021-09-07 LAB
TTG IGA SER-ACNC: 0.3 U/ML
TTG IGG SER-ACNC: <0.6 U/ML

## 2021-09-22 ENCOUNTER — TELEPHONE (OUTPATIENT)
Dept: FAMILY MEDICINE | Facility: CLINIC | Age: 26
End: 2021-09-22

## 2021-09-23 ENCOUNTER — NURSE TRIAGE (OUTPATIENT)
Dept: NURSING | Facility: CLINIC | Age: 26
End: 2021-09-23

## 2021-09-23 ENCOUNTER — ALLIED HEALTH/NURSE VISIT (OUTPATIENT)
Dept: FAMILY MEDICINE | Facility: CLINIC | Age: 26
End: 2021-09-23
Payer: COMMERCIAL

## 2021-09-23 DIAGNOSIS — Z00.00 HEALTHCARE MAINTENANCE: Primary | ICD-10-CM

## 2021-09-23 PROCEDURE — 90471 IMMUNIZATION ADMIN: CPT

## 2021-09-23 PROCEDURE — 90651 9VHPV VACCINE 2/3 DOSE IM: CPT

## 2021-09-23 PROCEDURE — 99207 PR NO CHARGE NURSE ONLY: CPT

## 2021-09-24 ENCOUNTER — MYC MEDICAL ADVICE (OUTPATIENT)
Dept: FAMILY MEDICINE | Facility: CLINIC | Age: 26
End: 2021-09-24

## 2021-09-24 NOTE — TELEPHONE ENCOUNTER
"Pt reports she got her third dose of HPV vaccine today and now has an \"itchy arm where vaccine was given, started to feel sweaty, mild lightheaded feeling, anxious, rash on cheeks which happens often with food reactions and head pressure, face feels hot but hands are freezing\". Pt sounds calm speaking to Writer and in no acute distress.    Reviewed common reactions with pt and provided reassurance that symptoms do not sound like anaphylactic reaction and pt past point where this is likely. Advised pt to rest with feet elevated, rub ice cube over itchy area, listen to relaxing music. If symptoms persist or worsen call back.    Pt verbalizes understanding and is agreeable to plan.         Reason for Disposition    Human Papilloma Virus (HPV) vaccine reactions    Additional Information    Negative: [1] Difficulty with breathing or swallowing AND [2] starts within 2 hours after injection    Negative: Difficult to awaken or acting confused (e.g., disoriented, slurred speech)    Negative: Unresponsive, passed out, or very weak    Negative: Sounds like a life-threatening emergency to the triager    Negative: Fever > 104 F (40 C)    Negative: [1] Fever > 101 F (38.3 C) AND [2] age > 60    Negative: [1] Fever > 100.0 F (37.8 C) AND [2] bedridden (e.g., nursing home patient, CVA, chronic illness, recovering from surgery)    Negative: [1] Fever > 100.0 F (37.8 C) AND [2] diabetes mellitus or weak immune system (e.g., HIV positive, cancer chemo, splenectomy, organ transplant, chronic steroids)    Negative: [1] Measles vaccine rash (onset day 6-12) AND [2] purple or blood-colored    Negative: Sounds like a severe, unusual reaction to the triager    Negative: [1] Redness or red streak around the injection site AND [2] begins > 48 hours after shot AND [3] fever    Negative: [1] Redness or red streak around the injection site AND [2] begins > 48 hours after shot AND [3] no fever  (Exception: red area < 1 inch or 2.5 cm wide)    " Negative: Fever present > 3 days (72 hours)    Negative: [1] Over 3 days (72 hours) since shot AND [2] redness, swelling or pain getting worse    Negative: [1] Smallpox vaccine and [2] eye pain, eye redness, or rash on eyelids    Negative: [1] Pain, tenderness, or swelling at the injection site AND [2] persists > 3 days    Negative: [1] Measles vaccine rash (onset day 6-12) AND [2] persists > 3 days    Negative: [1] Deep lump follows (in 2 to 8 weeks) Td or TDaP  shot AND [2] becomes tender to the touch    Negative: Immunization needed, questions about    Protocols used: IMMUNIZATION [id number]

## 2021-09-25 ENCOUNTER — NURSE TRIAGE (OUTPATIENT)
Dept: NURSING | Facility: CLINIC | Age: 26
End: 2021-09-25

## 2021-09-25 ENCOUNTER — MYC MEDICAL ADVICE (OUTPATIENT)
Dept: FAMILY MEDICINE | Facility: CLINIC | Age: 26
End: 2021-09-25

## 2021-09-26 NOTE — TELEPHONE ENCOUNTER
Patient states she received the 3rd dose of the HPV vaccine on 9/23. Patient found out afterwards that the vaccine has yeast in it. Patient is allergic to yeast. Patient states she is having on and off reactions since receiving the vaccine. She is not feeling super well. Flushed face, rash on chest, hives on the cheeks. She last took Benadryl last night. Patient states after her second HPV vaccine- she experienced a headache and dizziness.     Denies swollen tongue, facial swelling, difficulty breathing, widespread hives, fever. Home care advised. Did recommend taking Benadryl every 6 hours for 24 hours for the hives. Patient doesn't want to take Benadryl consistently because it makes her sleepy. RN advised she could take Claritin or Zyrtec instead. Advised if hives still persist after taking these meds for 24 hrs- to be seen by a provider. Patient states she will take a dose of Benadryl tonight, and start Zyrtec tomorrow.   Patient had no further questions.     Mary Campbell RN/[facility]        COVID 19 Nurse Triage Plan/Patient Instructions    Please be aware that novel coronavirus (COVID-19) may be circulating in the community. If you develop symptoms such as fever, cough, or SOB or if you have concerns about the presence of another infection including coronavirus (COVID-19), please contact your health care provider or visit [email or web address].     Disposition/Instructions    Home care recommended. Follow home care protocol based instructions.    Thank you for taking steps to prevent the spread of this virus.  o Limit your contact with others.  o Wear a simple mask to cover your cough.  o Wash your hands well and often.    Resources    [facility]: About COVID-19: [email or web address]    CDC: What to Do If You're Sick: www.cdc.gov/coronavirus/2019-ncov/about/steps-when-sick.html    CDC: Ending Home Isolation:  www.cdc.gov/coronavirus/2019-ncov/hcp/disposition-in-home-patients.html     CDC: Caring for Someone: www.cdc.gov/coronavirus/2019-ncov/if-you-are-sick/care-for-someone.html     [facility]: Interim Guidance for Hospital Discharge to Home: www.health.[facility].[place]/diseases/coronavirus/hcp/hospdischarge.pdf    [facility] clinical trials (COVID-19 research studies): clinicalaffairs.[facility]/umn-clinical-trials     Below are the COVID-19 hotlines at the [place] Department of Health ([facility]). Interpreters are available.   o For health questions: Call 038-031-2402 or 1-326.829.3424 (7 a.m. to 7 p.m.)  o For questions about schools and childcare: Call 417-975-6211 or 1-360.675.7638 (7 a.m. to 7 p.m.)     Reason for Disposition    Localized hives    Additional Information    Negative: [1] Life-threatening reaction (anaphylaxis) in the past to similar substance (e.g., food, insect bite/sting, chemical, etc.) AND [2] < 2 hours since exposure    Negative: Difficulty breathing or wheezing now    Negative: [1] Swollen tongue AND [2] rapid onset    Negative: [1] Hoarseness or cough now AND [2] rapid onset    Negative: Shock suspected (e.g., cold/pale/clammy skin, too weak to stand, low BP, rapid pulse)    Negative: Difficult to awaken or acting confused (e.g., disoriented, slurred speech)    Negative: Sounds like a life-threatening emergency to the triager    Negative: Swollen tongue    Negative: [1] Widespread hives, itching or facial swelling AND [2] onset < 2 hours of exposure to high-risk allergen (e.g., sting, nuts, 1st dose of antibiotic)    Negative: Patient sounds very sick or weak to the triager    Negative: [1] MODERATE-SEVERE hives persist (i.e., hives interfere with normal activities or work) AND [2] taking antihistamine (e.g., Benadryl, Claritin) > 24 hours    Negative: Joint swelling    Negative: [1] Abdominal pain AND [2] pain present > 12 hours    Negative: [1] Hives have become worse AND [2] taking oral steroids  (e.g., prednisone) > 24 hours    Negative: Fever    Negative: [1] Widespread hives, itching or facial swelling AND [2] onset > 2 hours after exposure to high-risk allergen (e.g., sting, nuts, 1st dose of antibiotic)    Negative: [1] Hives from food reaction AND [2] diagnosis never confirmed by a HCP    Negative: Hives persist > 1 week    Negative: [1] Hives has occurred 3 or more times in the last year AND [2] the cause was not found    Negative: [1] Hives from food reaction AND [2] diagnosis already confirmed    Protocols used: HIVES-A-

## 2021-09-27 ENCOUNTER — NURSE TRIAGE (OUTPATIENT)
Dept: NURSING | Facility: CLINIC | Age: 26
End: 2021-09-27

## 2021-09-27 ENCOUNTER — OFFICE VISIT (OUTPATIENT)
Dept: FAMILY MEDICINE | Facility: CLINIC | Age: 26
End: 2021-09-27
Payer: COMMERCIAL

## 2021-09-27 VITALS
RESPIRATION RATE: 16 BRPM | WEIGHT: 127 LBS | TEMPERATURE: 98.1 F | HEART RATE: 59 BPM | BODY MASS INDEX: 23.23 KG/M2 | SYSTOLIC BLOOD PRESSURE: 113 MMHG | OXYGEN SATURATION: 100 % | DIASTOLIC BLOOD PRESSURE: 70 MMHG

## 2021-09-27 DIAGNOSIS — T78.2XXA ANAPHYLAXIS, INITIAL ENCOUNTER: Primary | ICD-10-CM

## 2021-09-27 PROCEDURE — 99214 OFFICE O/P EST MOD 30 MIN: CPT | Mod: 25 | Performed by: PHYSICIAN ASSISTANT

## 2021-09-27 PROCEDURE — 96372 THER/PROPH/DIAG INJ SC/IM: CPT | Performed by: PHYSICIAN ASSISTANT

## 2021-09-27 RX ORDER — EPINEPHRINE 0.3 MG/.3ML
0.3 INJECTION SUBCUTANEOUS ONCE
Status: COMPLETED | OUTPATIENT
Start: 2021-09-27 | End: 2021-09-27

## 2021-09-27 RX ORDER — MONTELUKAST SODIUM 10 MG/1
TABLET ORAL
COMMUNITY
Start: 2021-08-31

## 2021-09-27 RX ADMIN — EPINEPHRINE 0.3 MG: 0.3 INJECTION SUBCUTANEOUS at 14:04

## 2021-09-27 ASSESSMENT — ENCOUNTER SYMPTOMS
WHEEZING: 1
CHILLS: 0
DIZZINESS: 1
HEADACHES: 1
FEVER: 0

## 2021-09-27 NOTE — PROGRESS NOTES
Patient presents with:  Allergic Reaction: to HPV, rash, sob      Clinical Decision Making: Patient meets criteria for anaphylaxis.  She was given EpiPen here in the clinic.  She was monitored every 15 minutes for 1 hour here in the clinic.  Vitally she remained stable.  She has a close friend who can continue to monitoring for the next 3 hours.  Her rash is beginning to improve.  She denies any current dizziness or shortness of breath.  Recommend that she continue with Zyrtec.      ICD-10-CM    1. Anaphylaxis, initial encounter  T78.2XXA EPINEPHrine (ANY BX GENERIC EQUIV) injection 0.3 mg       Patient Instructions   1.  Continue to monitor for the next 3 hours.  Recommend being your own house that 6 minutes away from [facility].  Seek emergency medical attention if you develop any worsening dizziness, fainting, or worsening respiratory symptoms.  2.  Continue with Zyrtec as you have been.      HPI:  Dorita Proctor is a 26 year old female who presents today complaining of possible allergic reaction to her third HPV vaccine.  Patient got her vaccine on Thursday and shortly after started experiencing rash, dizziness, wheezing.  She has a significant past medical history of other anaphylactic reactions.  She is allergic to yeast which is contraindication to the HPV vaccination.  She has been taking Zyrtec and Benadryl regularly, but has not used her EpiPen.  She has been using her albuterol nebulizer which is helped a little bit with the wheezing.  She is beginning to feel better, but continued to have rash and dizziness.    History obtained from the patient.    Problem List:  2013-09: Colitis  Vasovagal Syncope  Allergic Rhinitis  Acne  Allergies  Asthma      Past Medical History:   Diagnosis Date     Asthma        Social History     Tobacco Use     Smoking status: Never Smoker     Smokeless tobacco: Never Used   Substance Use Topics     Alcohol use: No       Review of Systems   Constitutional: Negative for  chills and fever.   Respiratory: Positive for wheezing.    Skin: Positive for rash.   Neurological: Positive for dizziness and headaches (Only after we gave her the EpiPen in the clinic).       Vitals:    09/27/21 1424 09/27/21 1441 09/27/21 1457 09/27/21 1513   BP: 113/71 111/69 104/63 113/70   BP Location:       Patient Position:       Cuff Size:       Pulse: 62 68 98 59   Resp:       Temp: 98.1  F (36.7  C) 98.1  F (36.7  C)     TempSrc:       SpO2: 98% 98% 100% 100%   Weight:           Physical Exam  Vitals and nursing note reviewed.   Constitutional:       General: She is not in acute distress.     Appearance: She is not toxic-appearing or diaphoretic.   HENT:      Head: Normocephalic and atraumatic.      Right Ear: External ear normal.      Left Ear: External ear normal.   Eyes:      Conjunctiva/sclera: Conjunctivae normal.   Cardiovascular:      Rate and Rhythm: Normal rate and regular rhythm.      Heart sounds: No murmur heard.     Pulmonary:      Effort: Pulmonary effort is normal. No respiratory distress.      Breath sounds: No stridor. No wheezing, rhonchi or rales.   Neurological:      Mental Status: She is alert.   Psychiatric:         Mood and Affect: Mood normal.         Behavior: Behavior normal.         Thought Content: Thought content normal.         Judgment: Judgment normal.     At the end of the encounter, I discussed results, diagnosis, medications. Discussed red flags for immediate return to clinic/ER, as well as indications for follow up if no improvement. Patient understood and agreed to plan. Patient was stable for discharge.

## 2021-09-27 NOTE — TELEPHONE ENCOUNTER
Got HPV  3rd dose on Thursday, and had hives and dizziness , and light headedness. On Thursday , and these side effects are still happening today.  Using Benadryl and Zyrtec , and the benadryl make her sleepy she states and she is falling asleep on the job.    Patient advised  To be seen at [facility].  As she still feels sick from getting the HPV vaccine.    Patient also waiting to\ hear back from her provider  Dr. Geovanna Benton.    Alayna Moncada, RN RN  [facility] Triage/refill nurse        Reason for Disposition    Patient wants to be seen    Immunization reactions, questions about    Additional Information    Negative: Fever present > 3 days (72 hours)    Negative: Smallpox vaccine and eye pain, eye redness, or rash on eyelids    Negative: Fever > 103 F (39.4 C)    Negative: Fever > 101 F (38.3 C) and over 60 years of age    Negative: Fever > 100.0 F (37.8 C) and has diabetes mellitus or a weak immune system (e.g., HIV positive, cancer chemotherapy, organ transplant, splenectomy, chronic steroids)    Negative: Fever > 100.0 F (37.8 C) and bedridden (e.g., nursing home patient, stroke, chronic illness, recovering from surgery)    Negative: Measles vaccine and purple/blood-colored rash (onset day 6-12)    Negative: Redness or red streak around the injection site begins > 48 hours after shot    Negative: Sounds like a severe, unusual reaction to the triager    Protocols used: IMMUNIZATION [id number]

## 2021-09-27 NOTE — PATIENT INSTRUCTIONS
1.  Continue to monitor for the next 3 hours.  Recommend being your own house that 6 minutes away from [facility].  Seek emergency medical attention if you develop any worsening dizziness, fainting, or worsening respiratory symptoms.  2.  Continue with Zyrtec as you have been.

## 2021-09-27 NOTE — TELEPHONE ENCOUNTER
Reason for Call:  Other      Detailed comments: :Patient called and stated she received her 3rd HPV vaccine on Friday 09/25/2021 and she had a mild alleric reaction to it over the weekend.  Patient states she has she is allergic to yeast and the vaccine has yeast in it.  Patient states she spoke to a triage nurse over the weekend and was told to use benedryl & zyrtec. Patient is wondering how long she should continue to use the benedryl and zyrtec and how long her symptoms will last? Patient is wondering if she should get a Rx for steroids? Please call patient to discuss her questions/concerns.    Phone Number Patient can be reached at: Home number on file [phone] (home)    Best Time: any    Can we leave a detailed message on this number? YES    Call taken on 9/27/2021 at 9:28 AM by Sabrina Escobedo

## 2021-09-28 ENCOUNTER — TRANSFERRED RECORDS (OUTPATIENT)
Dept: HEALTH INFORMATION MANAGEMENT | Facility: CLINIC | Age: 26
End: 2021-09-28

## 2021-09-28 NOTE — TELEPHONE ENCOUNTER
"I didn't receive these messages until after she was already evaluated in [facility].     I called Dorita today and we reviewed her sx/management and her appropriate choice to be evaluated.     She cannot recall significant effects with dose #2 except for LH/dizziness in retrospect.  However with HPV dose #3 recently she had notable anaphylactic sx (wheezing, hives, nausea) and doing better after her Epi pen.     Not going to work today (gets tired after using her Epi pen)    I indicated I will talk with management as it appears that all three Triage notes (9/23, 9/25, 9/26) did not get routed to me.     I will also help see if we can make a change to Epic to flag \"yeast\" as a component in the HPV vaccine for others going forward.       Geovanna Benton MD      "

## 2021-09-29 ENCOUNTER — NURSE TRIAGE (OUTPATIENT)
Dept: NURSING | Facility: CLINIC | Age: 26
End: 2021-09-29

## 2021-09-29 NOTE — TELEPHONE ENCOUNTER
3rd dose of HPV last Thursday. Patient states she is allergic to yeast, which is an ingredient in the HPV vaccine. She has been having reactions since Thursday. Seen in [facility] on Mon 9/27 for sx: dizziness, lightheaded, flushed face, rash. Given an Epipen to use if those sx return. They returned @ 7:30pm yesterday, and she used the pen and went to [place] ER. She says she feels fine in the am, towards the evening she starts to have reactions again. Now flushed in the face, rash on chest--red and looks like a sunburn, tiny hives along the jaw line, a little bit of stomach bloating. Temperature during this call= 98.4.  She was given a steroid last night @ 10pm in the ER. RX: Dexamethasone. She will taking the next dose of 2mg on Friday. She is also taking Zyrtec every am for 2 weeks. She took that today. She continues to take her regular allergy medication and asthma med: fluticasone and astelastine nasal sprays and Singular 10mg.     She has an allergist that she sees regularly: Dr Fuller @ [facility]. Patient called office for appointment,  not in today, urgent message will be given to her Dr tomorrow.     ER  yesterday told her to follow up with her PCP as soon as possible for a visit in a week, allergist as soon as possible for a visit in a week.     Triaged to a disposition of See PCP within 24 hrs.   Also discussed options: , [facility] or  if needed.  Call transferred to .    Keke Gomez RN Triage Nurse Advisor 5:13 PM 9/29/2021     Reason for Disposition    Hives or itching    Additional Information    Negative: [1] Life-threatening reaction (anaphylaxis) in the past to the same drug AND [2] < 2 hours since exposure    Negative: Difficulty breathing or wheezing    Negative: [1] Hoarseness or cough AND [2] started soon after 1st dose of drug    Negative: [1] Swollen tongue AND [2] started soon after 1st dose of drug    Negative: [1] Purple or blood-colored rash (spots or  dots) AND [2] fever    Negative: Sounds like a life-threatening emergency to the triager    Negative: Rash is only on 1 part of the body (localized)    Negative: Taking new non-prescription (OTC) antihistamine, decongestant, ear drops, eye drops, or other OTC cough/cold medicine    Negative: Taking new prescription antihistamine, allergy medicine, asthma medicine, eye drops, ear drops or nose drops    Negative: Rash started more than 3 days after stopping new prescription medicine    Negative: Swollen tongue    Negative: [1] Widespread hives AND [2] onset < 2 hours of exposure to 1st dose of drug    Negative: Fever    Negative: Patient sounds very sick or weak to the triager    Negative: [1] Purple or blood-colored rash (spots or dots) AND [2] no fever AND [3] sounds well to triager    Negative: [1] Taking new prescription medication AND [2] rash within 4 hours of 1st dose    Negative: Large or small blisters on skin (i.e., fluid filled bubbles or sacs)    Negative: Bloody crusts on lips or sores in mouth    Negative: Face or lip swelling    Protocols used: RASH - WIDESPREAD ON DRUGS-A-AH  COVID 19 Nurse Triage Plan/Patient Instructions    Please be aware that novel coronavirus (COVID-19) may be circulating in the community. If you develop symptoms such as fever, cough, or SOB or if you have concerns about the presence of another infection including coronavirus (COVID-19), please contact your health care provider or visit [email or web address].     Disposition/Instructions    In-Person Visit with provider recommended. Reference Visit Selection Guide.    Thank you for taking steps to prevent the spread of this virus.  o Limit your contact with others.  o Wear a simple mask to cover your cough.  o Wash your hands well and often.    Resources    [facility]: About COVID-19: [email or web address]    CDC: What to Do If You're Sick:  www.cdc.gov/coronavirus/2019-ncov/about/steps-when-sick.html    CDC: Ending Home Isolation: www.cdc.gov/coronavirus/2019-ncov/hcp/disposition-in-home-patients.html     CDC: Caring for Someone: www.cdc.gov/coronavirus/2019-ncov/if-you-are-sick/care-for-someone.html     [facility]: Interim Guidance for Hospital Discharge to Home: www.[facility].[place]./diseases/coronavirus/hcp/hospdischarge.pdf    [facility] clinical trials (COVID-19 research studies): clinicalaffairs.[facility]-clinical-trials     Below are the COVID-19 hotlines at the [place] Department of Health ([facility]). Interpreters are available.   o For health questions: Call 733-291-8335 or 1-966.727.5135 (7 a.m. to 7 p.m.)  o For questions about schools and childcare: Call 631-868-9093 or 1-522.904.6905 (7 a.m. to 7 p.m.)

## 2021-09-30 ENCOUNTER — OFFICE VISIT (OUTPATIENT)
Dept: FAMILY MEDICINE | Facility: CLINIC | Age: 26
End: 2021-09-30
Payer: COMMERCIAL

## 2021-09-30 VITALS
DIASTOLIC BLOOD PRESSURE: 60 MMHG | HEART RATE: 64 BPM | SYSTOLIC BLOOD PRESSURE: 102 MMHG | BODY MASS INDEX: 23.41 KG/M2 | WEIGHT: 128 LBS

## 2021-09-30 DIAGNOSIS — T78.2XXD ANAPHYLAXIS, SUBSEQUENT ENCOUNTER: Primary | ICD-10-CM

## 2021-09-30 PROCEDURE — 99215 OFFICE O/P EST HI 40 MIN: CPT | Performed by: FAMILY MEDICINE

## 2021-09-30 RX ORDER — DEXAMETHASONE 2 MG/1
10 TABLET ORAL
COMMUNITY
Start: 2021-09-28 | End: 2022-11-04

## 2021-09-30 RX ORDER — CETIRIZINE HYDROCHLORIDE 10 MG/1
10 TABLET ORAL DAILY
COMMUNITY
End: 2022-05-20

## 2021-09-30 NOTE — PROGRESS NOTES
"Assessment/plan   Dorita Proctor is a 26 year old female who is established to my practice.    Dorita was seen today for allergic reaction.    Diagnoses and all orders for this visit:    Anaphylaxis, subsequent encounter    I called Dorita  On 9/28 after her urgent care visit (as the RN triage notes were not sent to me for my review) and we reviewed her sx/management and her appropriate choice to be evaluated.      She cannot recall significant effects with dose #2 except for LH/dizziness in retrospect.  However with HPV dose #3 recently she had notable anaphylactic sx (wheezing, hives, nausea) and doing better after her Epi pen.     Pt and her mother are appreciative of the next steps we are taking for pt and others to prevent similar events.   Pt has talked with patient advocate which I encouraged on Tuesday when we talked  I indicated I will talk with management as it appears that all three Triage notes (9/23, 9/25, 9/26) did not get routed to me.   I will also help see if we can make a change to Epic to flag \"yeast\" as a component in the HPV vaccine for others going forward.   I will also inquire about more education for us as provider as many I've talked with were also not aware of the yeast contraindication (albeit rare allergy still important) and I did indicate that we will continue to improve processes here.   Will also submit this case to [employer]; this was completed without using optional pt identifiers as of 10/5/2021.    For now: anticipate the yeast component should be out of her system within a week of getting the vaccine; so the dexamethasone tomorrow should be sufficient to control any remaining sx.   Reviewed there may be other causes for her current sx (such as reactions to epi/steroids)  Ok to increase her zyrtec slowly; up to 2 tabs twice daily  Encouraged to contact her allergist and discuss this state of inflammation to all her 22 listed allergies that developed since her 2014 campylobacter " GI infection; with recent July ER visit for possible fish/unexplained reaction, then nexplanon hives, and now this. Consideration for mast cell activation syndrome evalation.            Follow up: Return in about 2 weeks (around 10/14/2021).  46 minutes spent on the date of the encounter doing chart review, patient visit and documentation.      Geovanna Benton MD    Subjective:      HPI: Dorita Proctor is a 26 year old female who is here for:    Chief Complaint   Patient presents with     Allergic Reaction     got her 3rd dose of HPV vaccine one week ago 9/23, has been having reactions since- most recent being 9/29, red rash, extreme headache, hot/cold and clammy feeling, has had to use her epi pen two times now.      Reaction to HPV dose #3:  Pt is here with her mom today    She received HPV dose #3 on Thursday 9/22.  Shortly after it started experiencing a rash, dizziness and wheezing.    She has a history of significant other anaphylactic reactions to other food  Triggers primarily.     I called her after she messaged me because she indicated she had an allergy to yeast which is a contraindication to the HPV vaccination that we were not aware of for those with moderate to severe yeast allergies.     She had been taking Zyrtec once daily  and Benadryl regularly.  She was evaluated on 9/27 at urgent care and given a dose of an EpiPen and her symptoms started to improve.    She felt like her symptoms were coming back so she presented to the [facility] ER/urgent care again on 9/28 and was given another dose of EpiPen and Decadron 10 mg at that time and was to repeat the dose tomorrow if still having symptoms.      Redness on her chest wall comes and goes- seems worse in the evenings.   Yesterday went to work and was doing well . But during lunch she started to sweat, get clammy and got a headache.  Layed on a couch for 2 hours.  Went home    Itchy red rash and facial flushing and extreme headache  No recent throat sx  "or chest tightness. No difficulty breathing.     Of note, she doesn't recall too many side effects after HPV dose #1 and dose#2 (\"maybe a little lightheaded/dizzy\")      She reports having had a yeast blood test in the past- after reacting to a subway sandwhich.   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 Rock climbing  at [employer].   Dating [place]- 6 years. No smoking; rare alcohol in the past 2 years (allergic reaction).  Geovanna Benton MD         Medical History:  Patient Active Problem List   Diagnosis     Vasovagal Syncope     Allergic Rhinitis     Acne     Allergies     Asthma     Colitis     Past Medical History:   Diagnosis Date     Asthma      Past Surgical History:   Procedure Laterality Date     HC REMOVE TONSILS/ADENOIDS,<13 Y/O      Description: Tonsillectomy With Adenoidectomy;  Recorded: 08/05/2011;     WISDOM TOOTH EXTRACTION       Citrullus vulgaris, Glucosamine, Other environmental allergy, Shellfish allergy, Spinach, Tuna flavor, Azithromycin, Bermuda grass, Chocolate flavor, Cucumber extract, Cumin oil, Dust mite extract, Garlic, Grass extracts [gramineae pollens], Melon, Penicillins, Ragweeds, Shellfish-derived products, Strawberry, Tomato, Tree nuts [nuts], and Yeast  Family History   Problem Relation Age of Onset     No Known Problems Mother      No Known Problems Father      No Known Problems Sister      No Known Problems Brother      Polymyalgia rheumatica Maternal Grandmother      Macular Degeneration Maternal Grandmother      Cerebrovascular Disease Maternal Grandfather      Heart Disease Maternal Grandfather      Hypertension Maternal Grandfather      Diabetes " Maternal Grandfather      Hypertension Paternal Grandmother      Dementia Paternal Grandmother      Pulmonary Embolism Paternal Grandfather      Colon Cancer No family hx of      Breast Cancer No family hx of        Medications:  Current Outpatient Medications   Medication     albuterol (PROAIR HFA/PROVENTIL HFA/VENTOLIN HFA) 108 (90 Base) MCG/ACT inhaler     azelastine (ASTELIN) 0.1 % nasal spray     budesonide-formoterol (SYMBICORT) 80-4.5 MCG/ACT Inhaler     cetirizine (ZYRTEC) 10 MG tablet     dexamethasone (DECADRON) 2 MG tablet     EPINEPHrine (ANY BX GENERIC EQUIV) 0.3 MG/0.3ML injection 2-pack     fluticasone (FLONASE) 50 MCG/ACT nasal spray     montelukast (SINGULAIR) 10 MG tablet     Montelukast Sodium (SINGULAIR PO)     norgestimate-ethinyl estradiol (ORTHO-CYCLEN) 0.25-35 MG-MCG tablet     VITAMIN D, CHOLECALCIFEROL, PO     No current facility-administered medications for this visit.         Imaging & Labs reviewed this visit: none      Objective:      Vitals:    09/30/21 1337   BP: 102/60   Pulse: 64   Weight: 58.1 kg (128 lb)       Physical Exam:     General: Alert, no acute distress.   HEENT: normocephalic conjunctivae are clear, Normal pearly TMs bilaterally without erythema, pus or fluid. Position and landmarks are normal.  Nose is clear.  Oropharynx is moist and clear, without tonsillar hypertrophy, asymmetry, exudate or lesions.   Neck: supple without adenopathy or thyromegaly.  Lungs: Good aeration bilaterally. Clear to auscultation without wheezes, rales or rhonci.   Heart: regular rate and rhythm, normal S1 and S2, no murmurs  Abdomen: soft and nontender  Skin: anterior chest wall appears sunburnt with diffuse erythema without rash/raised lesions; no hives evident today  Neuro: alert, interactive moving all extremities equally, normal muscle tone in all 4 extremities    Geovanna Benton MD

## 2021-09-30 NOTE — LETTER
September 30, 2021      Dorita Proctor  [address] [place] [address]        To Whom It May Concern:    Dorita Proctor was seen in our clinic. She missed work Monday afternoon, Tuesday, Wednesday night and Thursday afternoon of this week. Dorita is hoping to return to work on Monday afternoon for her next scheduled shift.     Sincerely,        Geovanna Benton MD

## 2021-09-30 NOTE — PATIENT INSTRUCTIONS
Ok to take Zyrtec up to 2 tablets twice daily for 1-2 weeks.     This could look like increasing to 1 tablet tonight; then just one in the morning, and 2 again at night.     Take the steroid in the morning if you think you need it.

## 2021-10-04 ENCOUNTER — MYC MEDICAL ADVICE (OUTPATIENT)
Dept: FAMILY MEDICINE | Facility: CLINIC | Age: 26
End: 2021-10-04

## 2021-10-06 NOTE — TELEPHONE ENCOUNTER
"Submitted her case to [facility] as of 10/5/2021, report number [id number]. Pt name/contact info was not provided for HIPPA purposes.    3rd HPV vaccine on Thursday 9/23/2021. Injection site was itchy; pt felt dizzy, sweaty, lightheaded, anxious, rash on cheeks,  and headache and abdominal symptoms and a rash on upper chest wall (\"flushing and inverse hives\" as she calls it).   Pt spoke to a triage nurse over the weekend and was told to use benedryl & zyrtec.   She was eventually seen in walk in care on 9/27/2021 for continued symptoms including some wheezing. She was told to use her epi pen and symptoms improved while she was there.  Symptoms recurred on 9/28/21 so she was seen in the urgent care: included a posterior headache, lightheadedness, abdominal distension, nausea, chest tightness and a rash involving her throat and upper chest. She took another EpiPen around 8:10 PM and presents to the ED for evaluation and discharged with a repeat dose of Decadron to take in a couple days if symptoms persisted.   She has continued to have symptoms come and go generally as the zyrtec and steroids are wearing off in the afternoons: rash, fatigue, headaches.      ------------  https://www.health.[facility].[place].us/people/immunize/hcp/contra.pdf  https://www.fda.gov/media/22571/download  Saccharomyces cerevisiae  "

## 2021-10-08 ENCOUNTER — TRANSFERRED RECORDS (OUTPATIENT)
Dept: HEALTH INFORMATION MANAGEMENT | Facility: CLINIC | Age: 26
End: 2021-10-08

## 2021-10-10 ENCOUNTER — HEALTH MAINTENANCE LETTER (OUTPATIENT)
Age: 26
End: 2021-10-10

## 2021-10-19 ENCOUNTER — TRANSFERRED RECORDS (OUTPATIENT)
Dept: HEALTH INFORMATION MANAGEMENT | Facility: CLINIC | Age: 26
End: 2021-10-19
Payer: COMMERCIAL

## 2021-11-03 ENCOUNTER — TRANSFERRED RECORDS (OUTPATIENT)
Dept: HEALTH INFORMATION MANAGEMENT | Facility: CLINIC | Age: 26
End: 2021-11-03
Payer: COMMERCIAL

## 2021-12-08 ENCOUNTER — TELEPHONE (OUTPATIENT)
Dept: FAMILY MEDICINE | Facility: CLINIC | Age: 26
End: 2021-12-08
Payer: COMMERCIAL

## 2021-12-08 NOTE — TELEPHONE ENCOUNTER
Patient dropped form off to be completed by Dr. Benton.  Last physical 05/2021.    Please call patient when done to be picked up.  [phone].    Routed to  core.

## 2021-12-09 NOTE — TELEPHONE ENCOUNTER
Form completed and patient called. Left message that her form is ready to be picked up at .  Courtney Truong LPN

## 2022-04-18 ENCOUNTER — TRANSFERRED RECORDS (OUTPATIENT)
Dept: HEALTH INFORMATION MANAGEMENT | Facility: CLINIC | Age: 27
End: 2022-04-18
Payer: COMMERCIAL

## 2022-05-20 ENCOUNTER — VIRTUAL VISIT (OUTPATIENT)
Dept: FAMILY MEDICINE | Facility: CLINIC | Age: 27
End: 2022-05-20
Payer: COMMERCIAL

## 2022-05-20 ENCOUNTER — NURSE TRIAGE (OUTPATIENT)
Dept: NURSING | Facility: CLINIC | Age: 27
End: 2022-05-20
Payer: COMMERCIAL

## 2022-05-20 DIAGNOSIS — U07.1 INFECTION DUE TO 2019 NOVEL CORONAVIRUS: ICD-10-CM

## 2022-05-20 DIAGNOSIS — R11.0 NAUSEA: ICD-10-CM

## 2022-05-20 DIAGNOSIS — J45.901 EXACERBATION OF ASTHMA, UNSPECIFIED ASTHMA SEVERITY, UNSPECIFIED WHETHER PERSISTENT: Primary | ICD-10-CM

## 2022-05-20 PROCEDURE — 99213 OFFICE O/P EST LOW 20 MIN: CPT | Mod: 95 | Performed by: FAMILY MEDICINE

## 2022-05-20 RX ORDER — PROMETHAZINE HYDROCHLORIDE 25 MG/1
25 TABLET ORAL EVERY 6 HOURS PRN
Qty: 24 TABLET | Refills: 0 | Status: SHIPPED | OUTPATIENT
Start: 2022-05-20 | End: 2022-11-04

## 2022-05-20 RX ORDER — FEXOFENADINE HCL 180 MG/1
TABLET ORAL
COMMUNITY
Start: 2022-05-20 | End: 2022-11-04

## 2022-05-20 RX ORDER — PREDNISONE 10 MG/1
20 TABLET ORAL DAILY
Qty: 10 TABLET | Refills: 0 | Status: SHIPPED | OUTPATIENT
Start: 2022-05-20 | End: 2022-05-25

## 2022-05-20 ASSESSMENT — ASTHMA QUESTIONNAIRES
QUESTION_4 LAST FOUR WEEKS HOW OFTEN HAVE YOU USED YOUR RESCUE INHALER OR NEBULIZER MEDICATION (SUCH AS ALBUTEROL): ONCE A WEEK OR LESS
ACT_TOTALSCORE: 20
QUESTION_5 LAST FOUR WEEKS HOW WOULD YOU RATE YOUR ASTHMA CONTROL: WELL CONTROLLED
QUESTION_2 LAST FOUR WEEKS HOW OFTEN HAVE YOU HAD SHORTNESS OF BREATH: ONCE OR TWICE A WEEK
QUESTION_1 LAST FOUR WEEKS HOW MUCH OF THE TIME DID YOUR ASTHMA KEEP YOU FROM GETTING AS MUCH DONE AT WORK, SCHOOL OR AT HOME: A LITTLE OF THE TIME
ACT_TOTALSCORE: 20
QUESTION_3 LAST FOUR WEEKS HOW OFTEN DID YOUR ASTHMA SYMPTOMS (WHEEZING, COUGHING, SHORTNESS OF BREATH, CHEST TIGHTNESS OR PAIN) WAKE YOU UP AT NIGHT OR EARLIER THAN USUAL IN THE MORNING: ONCE OR TWICE

## 2022-05-20 NOTE — TELEPHONE ENCOUNTER
Patient is calling and states that she just tested positive for covid this morning and has a history of asthma and bad allergies.  Fatigue is present and chills, drainage down back of throat congestion and slight cough.  Denies shortness of breath. Patient is requesting covid treatment.         COVID 19 Nurse Triage Plan/Patient Instructions    Please be aware that novel coronavirus (COVID-19) may be circulating in the community. If you develop symptoms such as fever, cough, or SOB or if you have concerns about the presence of another infection including coronavirus (COVID-19), please contact your health care provider or visit [email or web address].     Disposition/Instructions    Home care recommended. Follow home care protocol based instructions.    Thank you for taking steps to prevent the spread of this virus.  o Limit your contact with others.  o Wear a simple mask to cover your cough.  o Wash your hands well and often.    Resources    [facility]: About COVID-19: [email or web address]    CDC: What to Do If You're Sick: www.cdc.gov/coronavirus/2019-ncov/about/steps-when-sick.html    CDC: Ending Home Isolation: www.cdc.gov/coronavirus/2019-ncov/hcp/disposition-in-home-patients.html     CDC: Caring for Someone: www.cdc.gov/coronavirus/2019-ncov/if-you-are-sick/care-for-someone.html     [facility]: Interim Guidance for Hospital Discharge to Home: www.health.[facility].mn.us/diseases/coronavirus/hcp/hospdischarge.pdf    [facility] clinical trials (COVID-19 research studies): clinicalaffairs.[facility]-clinical-trials     Below are the COVID-19 hotlines at the [facility] ([facility]). Interpreters are available.   o For health questions: Call [phone] or 1-831.795.8359 (7 a.m. to 7 p.m.)  o For questions about schools and childcare: Call 185-887-4411 or 1-526.375.6959 (7 a.m. to 7 p.m.)                        Coronavirus (COVID-19) Notification    Caller Name (Patient,  "parent, daughter/son, grandparent, etc)  Dorita Proctor    Reason for call  Notify of Positive Coronavirus (COVID-19) lab results, assess symptoms,  review [facility] recommendations    Lab Result    Lab test:  2019-nCoV rRt-PCR or SARS-CoV-2 PCR    Oropharyngeal AND/OR nasopharyngeal swabs is POSITIVE for 2019-nCoV RNA/SARS-COV-2 PCR (COVID-19 virus)    Brief introduction script  Introduce self then review script:  \"I am calling on behalf of [employer].  We were notified that your Coronavirus test (COVID-19) for was POSITIVE for the virus.\"    Gather patient reported symptoms   Assessment   Current Symptoms at time of phone call, reported by patient: (if no symptoms, document No symptoms] Cough, congestion, fatigue and chills   Date of Symptom(s) onset (if applicable) May 19, 2022     If at time of call, Patients symptoms hare worsened, the Patient should contact 911 or have someone drive them to Emergency Dept promptly:      If Patient calling 911, inform 911 personal that you have tested positive for the Coronavirus (COVID-19).  Place mask on and await 911 to arrive.    If Emergency Dept, If possible, please have another adult drive you to the Emergency Dept but you need to wear mask when in contact with other people.      Monoclonal Antibody Administration    You may be eligible to receive a new treatment with a monoclonal antibody for preventing hospitalization in patients at high risk for complications from COVID-19. This medication is still experimental and available on a limited basis; it is given through an IV and must be given at an infusion center. Please note that not all people who are eligible will receive the medication since it is in limited supply.  Is the patient symptomatic and onset of symptoms within the last 7 days?  Yes  Is the patient interested in a visit with a provider to discuss treatment options?: Yes  Is the patient seen at [facility] or [place]?  No: Warm transfer caller " to [phone] to be scheduled with a virtual urgent provider.  During transfer, instruct  on appropriate time frame for visit     Review information with Patient    Your result was positive. This means you have COVID-19 (coronavirus).      How can I protect others?    These guidelines are for isolating before returning to work, school or .       If you DO have symptoms:  o Stay home and away from others  - For at least 5 days after your symptoms started, AND   - You are fever free for 24 hours (with no medicine that reduces fever), AND  - Your other symptoms are better.  o Wear a mask for 10 full days any time you are around others.    If you DON'T have symptoms:  o Stay at home and away from others for at least 5 days after your positive test.  o Wear a mask for 10 full days any time you are around others.    There may be different guidelines for healthcare facilities. Please check with the specific sites before arriving.     If you've been told by a doctor that you were severely ill with COVID-19 or are immunocompromised, you should isolate for at least 10 days.    You should not go back to work until you meet the guidelines above for ending your home isolation. You don't need to be retested for COVID-19 before going back to work--studies show that you won't spread the virus if it's been at least 10 days since your symptoms started (or 20 days, if you have a weak immune system).    Employers, schools, and daycares: This is an official notice for this person's medical guidelines for returning in-person. They must meet the above guidelines before going back to work, school, or  in person.    You will receive a positive COVID-19 letter via [employer] or the mail soon with additional self-care information (exception, no letters sent to presurgical/preprocedure patients).     Would you like me to review some of that information with you now?  Yes    How can I take care of myself?      Get lots of  rest. Drink extra fluids (unless a doctor has told you not to).      Take Tylenol (acetaminophen) for fever or pain. If you have liver or kidney problems, ask your family doctor if it's okay to take Tylenol.     Take either:     650 mg (two 325 mg pills) every 4 to 6 hours, or     1,000 mg (two 500 mg pills) every 8 hours as needed.     Note: Do not take more than 3,000 mg in one day. Acetaminophen is found in many medicines (both prescribed and over-the-counter medicines). Read all labels to be sure you don't take too much.    For children, check the Tylenol bottle for the right dose (based on their age or weight).      If you have other health problems (like cancer, heart failure, an organ transplant or severe kidney disease): Call your specialty clinic if you don't feel better in the next 2 days.      Know when to call 911: Emergency warning signs include:    Trouble breathing or shortness of breath    Pain or pressure in the chest that doesn't go away    Feeling confused like you haven't felt before, or not being able to wake up    Bluish-colored lips or face        If you were tested for an upcoming procedure, please contact your provider for next steps.     Cait Jiang RN

## 2022-05-20 NOTE — PROGRESS NOTES
Dorita is a 26 year old who is being evaluated via a billable video visit.      How would you like to obtain your AVS?   If the video visit is dropped, the invitation should be resent by:   Will anyone else be joining your video visit?       Video Start Time: 1:45 PM         Subjective   Dorita is a 26 year old who presents for the following health issues    History of Present Illness       Reason for visit:  Tested positive for covid looking for guidance on medications  Symptom onset:  1-3 days ago  Symptoms include:  Congestion, sinus preassure, nauseous, slight cough, drainage  Symptom intensity:  Moderate  Symptom progression:  Staying the same  Had these symptoms before:  Yes  Has tried/received treatment for these symptoms:  No  What makes it worse:  Being to active  What makes it better:  Resting and laying in bed and drinking fluids    She eats 2-3 servings of fruits and vegetables daily.She consumes 1 sweetened beverage(s) daily.She exercises with enough effort to increase her heart rate 30 to 60 minutes per day.  She exercises with enough effort to increase her heart rate 5 days per week.   She is taking medications regularly.          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    Physical Exam   GENERAL: Healthy, alert and no distress  EYES: Eyes grossly normal to inspection.  No discharge or erythema, or obvious scleral/conjunctival abnormalities.  RESP: No audible wheeze, cough, or visible cyanosis.  No visible  retractions or increased work of breathing.    SKIN: Visible skin clear. No significant rash, abnormal pigmentation or lesions.  NEURO: Cranial nerves grossly intact.  Mentation and speech appropriate for age.  PSYCH: Mentation appears normal, affect normal/bright, judgement and insight intact, normal speech and appearance well-groomed.    No results found.    ASSESSMENT / PLAN:  (J45.901) Exacerbation of asthma, unspecified asthma severity, unspecified whether persistent  (primary encounter diagnosis)  Comment: patient has good set of med for asthma / allergies.  Continue all those but add short course of prednisone since asthma acting up some. This has helped in past.    Plan: predniSONE (DELTASONE) 10 MG tablet             (R11.0) Nausea  Comment: use this prn. Stay well hydrated.  Advance diet as tolerated.  Plan: promethazine (PHENERGAN) 25 MG tablet             (U07.1) Infection due to 2019 novel coronavirus  Comment: discussed in detail.  Patient has asthma but this is very well controlled.  Oxygen level fine.  She is young and otherwise very healthy.  We agree paxlovid not needed.  If symptoms worsen in next couple days then do another virtual visit.  If gets real bad, to emergency room.  Patient is vaccinated for covid.   Plan: as above ; discussed over the counter meds also       I reviewed the patient's medications, allergies, medical history, family history, and social history.    Tito Weaver MD            Video-Visit Details    Type of service:  Video Visit    Video End Time:2:05 PM    Originating Location (pt. Location): Home    Distant Location (provider location):  [facility]     Platform used for Video Visit: [employer]

## 2022-05-20 NOTE — PATIENT INSTRUCTIONS
Take the prednisone    Promethazine as needed for nausea    Stay well hydrated    If symptoms worsen, do another virtual visit    If symptoms get really bad, to emergency room     Mucinex okay

## 2022-05-21 ENCOUNTER — HEALTH MAINTENANCE LETTER (OUTPATIENT)
Age: 27
End: 2022-05-21

## 2022-09-18 ENCOUNTER — HEALTH MAINTENANCE LETTER (OUTPATIENT)
Age: 27
End: 2022-09-18

## 2022-11-04 ENCOUNTER — OFFICE VISIT (OUTPATIENT)
Dept: FAMILY MEDICINE | Facility: CLINIC | Age: 27
End: 2022-11-04
Payer: COMMERCIAL

## 2022-11-04 VITALS
HEART RATE: 68 BPM | WEIGHT: 138 LBS | RESPIRATION RATE: 20 BRPM | TEMPERATURE: 98.5 F | BODY MASS INDEX: 25.4 KG/M2 | OXYGEN SATURATION: 98 % | SYSTOLIC BLOOD PRESSURE: 116 MMHG | HEIGHT: 62 IN | DIASTOLIC BLOOD PRESSURE: 74 MMHG

## 2022-11-04 DIAGNOSIS — Z00.00 ROUTINE GENERAL MEDICAL EXAMINATION AT A HEALTH CARE FACILITY: Primary | ICD-10-CM

## 2022-11-04 DIAGNOSIS — Z30.09 COUNSELING FOR BIRTH CONTROL, ORAL CONTRACEPTIVES: ICD-10-CM

## 2022-11-04 PROCEDURE — 99395 PREV VISIT EST AGE 18-39: CPT | Performed by: FAMILY MEDICINE

## 2022-11-04 RX ORDER — AMITRIPTYLINE HYDROCHLORIDE 10 MG/1
10 TABLET ORAL DAILY
COMMUNITY
Start: 2022-08-08 | End: 2022-11-04

## 2022-11-04 RX ORDER — FEXOFENADINE HCL 180 MG/1
TABLET ORAL
COMMUNITY
Start: 2022-03-01

## 2022-11-04 ASSESSMENT — ASTHMA QUESTIONNAIRES: ACT_TOTALSCORE: 22

## 2022-11-04 ASSESSMENT — ENCOUNTER SYMPTOMS
ARTHRALGIAS: 0
DIARRHEA: 0
FREQUENCY: 0
SORE THROAT: 0
COUGH: 0
ABDOMINAL PAIN: 0
CONSTIPATION: 1
CHILLS: 0
DYSURIA: 0
EYE PAIN: 0
WEAKNESS: 0
JOINT SWELLING: 0
NAUSEA: 1
PALPITATIONS: 0
HEMATURIA: 0
BREAST MASS: 0
FEVER: 0
SHORTNESS OF BREATH: 0
MYALGIAS: 0
HEMATOCHEZIA: 0
PARESTHESIAS: 0
HEADACHES: 1
DIZZINESS: 0
NERVOUS/ANXIOUS: 0
HEARTBURN: 0

## 2022-11-04 NOTE — PROGRESS NOTES
SUBJECTIVE:   CC: Dorita is an 27 year old who presents for preventive health visit.       Patient has been advised of split billing requirements and indicates understanding: Yes       Healthy Habits:     Getting at least 3 servings of Calcium per day:  Yes    Bi-annual eye exam:  NO    Dental care twice a year:  Yes    Sleep apnea or symptoms of sleep apnea:  None    Diet:  Other    Frequency of exercise:  6-7 days/week    Duration of exercise:  45-60 minutes    Taking medications regularly:  Yes    Medication side effects:  Not applicable    PHQ-2 Total Score: 0    Additional concerns today:  No      Chief Complaint   Patient presents with     Physical     She got a concussion in September; she was putting away a tube in the cabin and accidentally got pushed into some metal chairs; dizzy and nauseated after that. Went to be evaluated and CT scan normal  She is still working with PT/OT at [facility] And a concussion clinic with a provider- sees the doctor on 11/14/22. Doing vision therapy- this has really been helpful. 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   Recently went back to trying to rock climb  She never took the amitriptyline for sleep that was offered  She does get nausea    Pap smear is UTD form 3/2021    Hx of asthma and severe allergies. She follows with an allergist for significant hx of food and pollen allergies. She does get immunotherapy.    She hasn't tried birth control pills yet. Has Orthocyclin on file from earlier.   She had a reaction to the nexplanon implant  Has plans for a food challenge at her allergist  The allergist does testing for the flu shot each year.  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   Dating Adrian- 6 years. No smoking; rare alcohol in the past 2 years (allergic reaction).  Geovanna Benton MD           Today's PHQ-2 Score:   PHQ-2 ( 1999 Pfizer) 11/4/2022   Q1: Little interest or pleasure in doing things 0   Q2: Feeling down, depressed or hopeless 0   PHQ-2 Score 0   Q1: Little interest or pleasure in doing things Not at all   Q2: Feeling down, depressed or hopeless Not at all   PHQ-2 Score 0       Abuse: Current or Past (Physical, Sexual or Emotional) - No  Do you feel safe in your environment? Yes    Have you ever done Advance Care Planning? (For example, a Health Directive, POLST, or a discussion with a medical provider or your loved ones about your wishes): No, advance care planning information given to patient to review.  Patient declined advance care planning discussion at this time.    Social History     Tobacco Use     Smoking status: Never     Smokeless tobacco: Never   Substance Use Topics     Alcohol use: No         Alcohol Use 11/4/2022   Prescreen: >3 drinks/day or >7 drinks/week? Not Applicable       Reviewed orders with patient.  Reviewed health maintenance and updated orders accordingly - Yes    Breast Cancer Screening:    Breast CA Risk Assessment (FHS-7) 11/4/2022   Do you have a family history of breast, colon, or ovarian cancer? No / Unknown           Pertinent mammograms are reviewed under the imaging tab.    History of abnormal Pap smear: NO - age 21-29 PAP every 3 years recommended  PAP / HPV Latest Ref Rng & Units 3/12/2021   PAP Negative for squamous intraepithelial lesion or malignancy. Negative for squamous intraepithelial lesion or malignancy  Electronically signed by Latoya Desai CT (ASCP) on 3/15/2021 at  3:17 PM       Reviewed and updated as needed this visit by clinical staff   Tobacco  Allergies  Meds   Med Hx  Surg Hx  Fam Hx  Soc Hx     "    Reviewed and updated as needed this visit by Provider                     Review of Systems   Constitutional: Negative for chills and fever.   HENT: Positive for ear pain. Negative for congestion, hearing loss and sore throat.    Eyes: Positive for visual disturbance. Negative for pain.   Respiratory: Negative for cough and shortness of breath.    Cardiovascular: Negative for chest pain, palpitations and peripheral edema.   Gastrointestinal: Positive for constipation and nausea. Negative for abdominal pain, diarrhea, heartburn and hematochezia.   Breasts:  Negative for tenderness, breast mass and discharge.   Genitourinary: Positive for vaginal discharge. Negative for dysuria, frequency, genital sores, hematuria, pelvic pain, urgency and vaginal bleeding.   Musculoskeletal: Negative for arthralgias, joint swelling and myalgias.   Skin: Negative for rash.   Neurological: Positive for headaches. Negative for dizziness, weakness and paresthesias.   Psychiatric/Behavioral: Negative for mood changes. The patient is not nervous/anxious.         OBJECTIVE:   /74   Pulse 68   Temp 98.5  F (36.9  C)   Resp 20   Ht 1.575 m (5' 2\")   Wt 62.6 kg (138 lb)   SpO2 98%   BMI 25.24 kg/m    Physical Exam  GENERAL: healthy, alert and no distress  EYES: Eyes grossly normal to inspection, PERRL and conjunctivae and sclerae normal  HENT: ear canals and TM's normal, nose and mouth without ulcers or lesions  NECK: no adenopathy, no asymmetry, masses, or scars and thyroid normal to palpation  RESP: lungs clear to auscultation - no rales, rhonchi or wheezes  CV: regular rate and rhythm, normal S1 S2, no S3 or S4, no murmur, click or rub, no peripheral edema and peripheral pulses strong  ABDOMEN: soft, nontender, no hepatosplenomegaly, no masses and bowel sounds normal  MS: no gross musculoskeletal defects noted, no edema    Diagnostic Test Results:  Labs reviewed in Epic    ASSESSMENT/PLAN:   Dorita was seen today for " "physical.    Diagnoses and all orders for this visit:    Routine general medical examination at a health care facility  Declines labs today; done in 2021 and normal Hb/glucose/lipids  Staying active  Pap is UTD from 3/2021; due in 3 years - she thinks she would prefer to hold off on an annual physical until that time if not requiring med refills    Counseling for birth control, oral contraceptives  Previously had reaction to nexplanon; hasn't yet tried the OCPs that were sent last year (Ortho Cyclin). She has talked to her allergist about option for copper IUD and to try a patch test of sorts with the IUD itself; will need to look into this option at our clinic and how to send through her insurance. She is not aware if the allergist has a copper patch test to otherwise do more formally.    Asthma, allergies  Following with allergist; immunotherapy. ACT updated today.          Other orders  -     REVIEW OF HEALTH MAINTENANCE PROTOCOL ORDERS        Patient has been advised of split billing requirements and indicates understanding: Yes      COUNSELING:  Reviewed preventive health counseling, as reflected in patient instructions    Estimated body mass index is 25.24 kg/m  as calculated from the following:    Height as of this encounter: 1.575 m (5' 2\").    Weight as of this encounter: 62.6 kg (138 lb).        She reports that she has never smoked. She has never used smokeless tobacco.      Counseling Resources:  ATP IV Guidelines  Pooled Cohorts Equation Calculator  Breast Cancer Risk Calculator  BRCA-Related Cancer Risk Assessment: FHS-7 Tool  FRAX Risk Assessment  ICSI Preventive Guidelines  Dietary Guidelines for Americans, 2010  USDA's MyPlate  ASA Prophylaxis  Lung CA Screening    Geovanna Benton MD  [facility]"

## 2023-03-01 ENCOUNTER — TRANSFERRED RECORDS (OUTPATIENT)
Dept: HEALTH INFORMATION MANAGEMENT | Facility: CLINIC | Age: 28
End: 2023-03-01

## 2023-03-14 ENCOUNTER — TELEPHONE (OUTPATIENT)
Dept: FAMILY MEDICINE | Facility: CLINIC | Age: 28
End: 2023-03-14
Payer: COMMERCIAL

## 2023-03-14 DIAGNOSIS — Z30.09 COUNSELING FOR BIRTH CONTROL, ORAL CONTRACEPTIVES: ICD-10-CM

## 2023-03-15 RX ORDER — NORGESTIMATE AND ETHINYL ESTRADIOL 0.25-0.035
KIT ORAL
Qty: 84 TABLET | Refills: 3 | OUTPATIENT
Start: 2023-03-15

## 2023-03-15 NOTE — TELEPHONE ENCOUNTER
Outpatient Medication Detail     Disp Refills Start End ELOISA   norgestimate-ethinyl estradiol (ORTHO-CYCLEN) 0.25-35 MG-MCG tablet (Discontinued) 84 tablet 3 9/3/2021 11/4/2022 --   Sig - Route: Take 1 tablet by mouth daily - Oral   Sent to pharmacy as: Norgestimate-Eth Estradiol 0.25-35 MG-MCG Oral Tablet (ORTHO-CYCLEN)   Class: E-Prescribe

## 2023-03-15 NOTE — TELEPHONE ENCOUNTER
Medication Question or Refill    Contacts       Type Contact Phone/Fax    03/14/2023 12:51 PM CDT Interface (Incoming) [facility] #[id number] [facility] [address] AT [facility] [phone]          What medication are you calling about (include dose and sig)?:   norgestimate-ethinyl estradiol (ORTHO-CYCLEN) 0.25-35 MG-MCG tablet (Discontinued) 84      Patient is requesting a refill. Patient is currently on this medication for birth control as this is her second month and is needing a refill. Patient discontinued a while ago, but patient has been on for 2 months (she used the last two months of her last script) and is doing fine. But now she is down to her sugar pills portion of the Rx and needs a refill urgently.    Preferred Pharmacy:   [facility] #[id number] - [facility] [address] [address]  Phone: [phone] Fax: [phone]      Controlled Substance Agreement on file:   CSA -- Patient Level:    CSA: None found at the patient level.       Who prescribed the medication?: Dr. Benton    Do you need a refill? Yes    When did you use the medication last? Today. Down to sugar pills on Rx    Patient offered an appointment? No    Do you have any questions or concerns?  Patient is almost out. Per patients request, please call her when this is sent to her pharmacy. Also, patient mentioned that when she called to fill a few days ago, the caller told her it was already sent to her pharmacy. It was not.      Could we send this information to you in [facility] or would you prefer to receive a phone call?:   Patient would prefer a phone call   Okay to leave a detailed message?: Yes at Cell number on file:    Telephone Information:   Mobile [phone]

## 2023-03-20 ENCOUNTER — MYC MEDICAL ADVICE (OUTPATIENT)
Dept: FAMILY MEDICINE | Facility: CLINIC | Age: 28
End: 2023-03-20
Payer: COMMERCIAL

## 2023-03-20 DIAGNOSIS — Z30.09 COUNSELING FOR BIRTH CONTROL, ORAL CONTRACEPTIVES: Primary | ICD-10-CM

## 2023-03-20 NOTE — TELEPHONE ENCOUNTER
See MyChart from patient needing PCP review. Would you like pt to schedule a med check appt or are you able to fill med? Med pended for refill.    Please respond directly to patient if at all able.    Gina Mantilla RN  [facility]

## 2023-03-20 NOTE — TELEPHONE ENCOUNTER
General Call    Contacts       Type Contact Phone/Fax    03/14/2023 12:51 PM CDT Interface (Incoming) [facility] #[id number] - [facility] [address]  AT [facility] [address] [phone]    03/15/2023 12:56 PM CDT Phone (Incoming) Dorita Proctor (Self) [phone] (H)    03/20/2023 10:46 AM CDT Phone (Incoming) Dorita Proctor (Self) [phone] (H)        Reason for Call:  Patient calling regarding status of her medication for  norgestimate-ethinyl estradiol (ORTHO-CYCLEN) 0.25-35 MG-MCG tablet (Discontinued) 84       Please Advise.    Writer offered appointment with provider. Patient said chart notes have already been sent.  Patient said she will send a message to PCP in [employer].     What are your questions or concerns:  See chart attached notes.    Date of last appointment with provider:  11/4/22   Dr. Benton    Could we send this information to you in [employer] or would you prefer to receive a phone call?:   Patient would prefer a phone call   Okay to leave a detailed message?: Yes at Work number on file:  [phone] (Home Phone)

## 2023-03-21 RX ORDER — NORGESTIMATE AND ETHINYL ESTRADIOL 0.25-0.035
1 KIT ORAL DAILY
Qty: 84 TABLET | Refills: 1 | Status: SHIPPED | OUTPATIENT
Start: 2023-03-21 | End: 2023-09-07

## 2023-04-14 ENCOUNTER — TRANSFERRED RECORDS (OUTPATIENT)
Dept: HEALTH INFORMATION MANAGEMENT | Facility: CLINIC | Age: 28
End: 2023-04-14

## 2023-09-06 DIAGNOSIS — Z30.09 COUNSELING FOR BIRTH CONTROL, ORAL CONTRACEPTIVES: ICD-10-CM

## 2023-09-07 RX ORDER — NORGESTIMATE AND ETHINYL ESTRADIOL 0.25-0.035
1 KIT ORAL DAILY
Qty: 84 TABLET | Refills: 0 | Status: SHIPPED | OUTPATIENT
Start: 2023-09-07 | End: 2023-11-27

## 2023-09-07 NOTE — TELEPHONE ENCOUNTER
Prescription approved per [facility] Refill Protocol.  Leslie SALOMON RN  [facility]

## 2023-10-17 DIAGNOSIS — Z30.09 COUNSELING FOR BIRTH CONTROL, ORAL CONTRACEPTIVES: ICD-10-CM

## 2023-10-17 RX ORDER — NORGESTIMATE AND ETHINYL ESTRADIOL 0.25-0.035
1 KIT ORAL DAILY
Qty: 84 TABLET | Refills: 0 | OUTPATIENT
Start: 2023-10-17

## 2023-11-06 ENCOUNTER — MYC MEDICAL ADVICE (OUTPATIENT)
Dept: FAMILY MEDICINE | Facility: CLINIC | Age: 28
End: 2023-11-06
Payer: COMMERCIAL

## 2023-11-07 NOTE — TELEPHONE ENCOUNTER
Called pt, pt currently wants to wait on starting Paxlvoid and will message tomorrow if changes mind.    Chantal Herman RN BSN  [facility]

## 2023-11-26 DIAGNOSIS — Z30.09 COUNSELING FOR BIRTH CONTROL, ORAL CONTRACEPTIVES: ICD-10-CM

## 2023-11-27 RX ORDER — NORGESTIMATE AND ETHINYL ESTRADIOL 0.25-0.035
1 KIT ORAL DAILY
Qty: 84 TABLET | Refills: 3 | Status: SHIPPED | OUTPATIENT
Start: 2023-11-27

## 2023-11-28 ASSESSMENT — ASTHMA QUESTIONNAIRES
QUESTION_4 LAST FOUR WEEKS HOW OFTEN HAVE YOU USED YOUR RESCUE INHALER OR NEBULIZER MEDICATION (SUCH AS ALBUTEROL): TWO OR THREE TIMES PER WEEK
QUESTION_1 LAST FOUR WEEKS HOW MUCH OF THE TIME DID YOUR ASTHMA KEEP YOU FROM GETTING AS MUCH DONE AT WORK, SCHOOL OR AT HOME: NONE OF THE TIME
ACT_TOTALSCORE: 20
ACT_TOTALSCORE: 20
QUESTION_3 LAST FOUR WEEKS HOW OFTEN DID YOUR ASTHMA SYMPTOMS (WHEEZING, COUGHING, SHORTNESS OF BREATH, CHEST TIGHTNESS OR PAIN) WAKE YOU UP AT NIGHT OR EARLIER THAN USUAL IN THE MORNING: NOT AT ALL
QUESTION_2 LAST FOUR WEEKS HOW OFTEN HAVE YOU HAD SHORTNESS OF BREATH: ONCE OR TWICE A WEEK
QUESTION_5 LAST FOUR WEEKS HOW WOULD YOU RATE YOUR ASTHMA CONTROL: SOMEWHAT CONTROLLED

## 2023-11-28 ASSESSMENT — ENCOUNTER SYMPTOMS
MYALGIAS: 0
CONSTIPATION: 0
DYSURIA: 0
SORE THROAT: 0
HEMATOCHEZIA: 0
FEVER: 0
HEADACHES: 0
COUGH: 0
SHORTNESS OF BREATH: 0
JOINT SWELLING: 0
NERVOUS/ANXIOUS: 0
PALPITATIONS: 0
NAUSEA: 0
CHILLS: 0
EYE PAIN: 0
FREQUENCY: 0
HEARTBURN: 1
WEAKNESS: 0
PARESTHESIAS: 0
DIARRHEA: 0
ABDOMINAL PAIN: 0
HEMATURIA: 0
ARTHRALGIAS: 0
BREAST MASS: 0
DIZZINESS: 0

## 2023-11-30 ENCOUNTER — OFFICE VISIT (OUTPATIENT)
Dept: FAMILY MEDICINE | Facility: CLINIC | Age: 28
End: 2023-11-30
Payer: COMMERCIAL

## 2023-11-30 VITALS
HEIGHT: 62 IN | DIASTOLIC BLOOD PRESSURE: 62 MMHG | BODY MASS INDEX: 25.78 KG/M2 | WEIGHT: 140.1 LBS | SYSTOLIC BLOOD PRESSURE: 122 MMHG | HEART RATE: 66 BPM | OXYGEN SATURATION: 98 %

## 2023-11-30 DIAGNOSIS — Z91.09 OTHER ALLERGY, OTHER THAN TO MEDICINAL AGENTS: ICD-10-CM

## 2023-11-30 DIAGNOSIS — F33.8 SEASONAL AFFECTIVE DISORDER (H): ICD-10-CM

## 2023-11-30 DIAGNOSIS — Z30.09 COUNSELING FOR BIRTH CONTROL, ORAL CONTRACEPTIVES: ICD-10-CM

## 2023-11-30 DIAGNOSIS — Z00.00 ROUTINE GENERAL MEDICAL EXAMINATION AT A HEALTH CARE FACILITY: Primary | ICD-10-CM

## 2023-11-30 DIAGNOSIS — J45.30 MILD PERSISTENT ASTHMA WITHOUT COMPLICATION: ICD-10-CM

## 2023-11-30 PROCEDURE — 99395 PREV VISIT EST AGE 18-39: CPT | Performed by: FAMILY MEDICINE

## 2023-11-30 RX ORDER — BUDESONIDE AND FORMOTEROL FUMARATE DIHYDRATE 160; 4.5 UG/1; UG/1
2 AEROSOL RESPIRATORY (INHALATION)
COMMUNITY
Start: 2023-04-14

## 2023-11-30 ASSESSMENT — ENCOUNTER SYMPTOMS
PARESTHESIAS: 0
ARTHRALGIAS: 0
SHORTNESS OF BREATH: 0
FREQUENCY: 0
HEADACHES: 0
COUGH: 0
PALPITATIONS: 0
CHILLS: 0
EYE PAIN: 0
BREAST MASS: 0
NAUSEA: 0
JOINT SWELLING: 0
WEAKNESS: 0
HEMATURIA: 0
DYSURIA: 0
ABDOMINAL PAIN: 0
DIARRHEA: 0
HEMATOCHEZIA: 0
FEVER: 0
SORE THROAT: 0
NERVOUS/ANXIOUS: 0
HEARTBURN: 1
MYALGIAS: 0
DIZZINESS: 0
CONSTIPATION: 0

## 2023-11-30 ASSESSMENT — ANXIETY QUESTIONNAIRES
6. BECOMING EASILY ANNOYED OR IRRITABLE: SEVERAL DAYS
1. FEELING NERVOUS, ANXIOUS, OR ON EDGE: SEVERAL DAYS
5. BEING SO RESTLESS THAT IT IS HARD TO SIT STILL: NOT AT ALL
2. NOT BEING ABLE TO STOP OR CONTROL WORRYING: NOT AT ALL
IF YOU CHECKED OFF ANY PROBLEMS ON THIS QUESTIONNAIRE, HOW DIFFICULT HAVE THESE PROBLEMS MADE IT FOR YOU TO DO YOUR WORK, TAKE CARE OF THINGS AT HOME, OR GET ALONG WITH OTHER PEOPLE: NOT DIFFICULT AT ALL
3. WORRYING TOO MUCH ABOUT DIFFERENT THINGS: SEVERAL DAYS
7. FEELING AFRAID AS IF SOMETHING AWFUL MIGHT HAPPEN: NOT AT ALL
GAD7 TOTAL SCORE: 4
GAD7 TOTAL SCORE: 4

## 2023-11-30 ASSESSMENT — PATIENT HEALTH QUESTIONNAIRE - PHQ9
5. POOR APPETITE OR OVEREATING: SEVERAL DAYS
SUM OF ALL RESPONSES TO PHQ QUESTIONS 1-9: 3

## 2023-11-30 NOTE — PROGRESS NOTES
SUBJECTIVE:   Dorita is a 28 year old, presenting for the following:  Physical (Not fasting) and Depression (Would like to discuss seasonal depression/)        11/30/2023    10:20 AM   Additional Questions   Roomed by Courtney PAVON LPN       Healthy Habits:     Getting at least 3 servings of Calcium per day:  Yes    Bi-annual eye exam:  NO    Dental care twice a year:  Yes    Sleep apnea or symptoms of sleep apnea:  None    Diet:  Other    Frequency of exercise:  4-5 days/week    Duration of exercise:  45-60 minutes    Taking medications regularly:  Yes    Medication side effects:  Not applicable    Additional concerns today:  Yes    Chief Complaint   Patient presents with    Physical     Not fasting    Depression     Would like to discuss seasonal depression       Has noticed each winter her mood seems worse. Less motivated and less energy for her usual routines  worse this fall/winter. She restarted vitamin D 2 weeks ago and this is helping somewhat. She has a Happy Light at her house but hasn't ever used it- hard to find a good time to use it  Her alarm clock is a happy light with waking up each morning.    Pap due in March- on her period and declines today    OCP has been going well; I sent refill a few days ago     Asthma: followed by outside allergy/asthma provider. On symbicort, Singulair, francheska, allegra, has epi pen    She does do workouts in the mornings    She is cleared from her concussion in Feb 2023; has had subsequent anxiety from this - once had a panic attack in a wedding event that was loud/chaotic. Did try grounding techniques which helped somewhat. She prefers to not  take medications. 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   Dating Adrian- 7 years. No smoking; rare alcohol in the past 2 years (allergic reaction).   Geovanna Benton MD             2023    10:33 AM   PHQ   PHQ-9 Total Score 3   Q9: Thoughts of better off dead/self-harm past 2 weeks Not at all       Today's PHQ-2 Score:       2023    10:13 AM   PHQ-2 (  Pfizer)   Q1: Little interest or pleasure in doing things 1   Q2: Feeling down, depressed or hopeless 0   PHQ-2 Score 1   Q1: Little interest or pleasure in doing things Several days   Q2: Feeling down, depressed or hopeless Not at all   PHQ-2 Score 1       Social History     Tobacco Use    Smoking status: Never    Smokeless tobacco: Never   Substance Use Topics    Alcohol use: No           2023    10:16 AM   Alcohol Use   Prescreen: >3 drinks/day or >7 drinks/week? Not Applicable          No data to display              Reviewed orders with patient.  Reviewed health maintenance and updated orders accordingly - Yes      Breast Cancer Screenin/4/2022     2:50 PM   Breast CA Risk Assessment (FHS-7)   Do you have a family history of breast, colon, or ovarian cancer? No / Unknown         Pertinent mammograms are reviewed under the imaging tab.    History of abnormal Pap smear: NO - age 21-29 PAP every 3 years recommended      Latest Ref Rng & Units 3/12/2021     5:18 PM   PAP / HPV   PAP Negative for squamous intraepithelial lesion or malignancy. Negative for squamous intraepithelial lesion or malignancy  Electronically signed by Latoya Desai CT (ASCP) on 3/15/2021 at  3:17 PM        Reviewed and updated as needed this visit by clinical staff   Tobacco  Allergies  Meds              Reviewed and updated as needed this visit by Provider                     Review of Systems   Constitutional:  Negative for chills and fever.   HENT:  Negative for congestion, ear pain, hearing loss and sore throat.    Eyes:  Negative for pain and visual disturbance.   Respiratory:  Negative for cough and shortness of breath.    Cardiovascular:  Negative for chest pain, palpitations and  "peripheral edema.   Gastrointestinal:  Positive for heartburn. Negative for abdominal pain, constipation, diarrhea, hematochezia and nausea.   Breasts:  Negative for tenderness, breast mass and discharge.   Genitourinary:  Negative for dysuria, frequency, genital sores, hematuria, pelvic pain, urgency, vaginal bleeding and vaginal discharge.   Musculoskeletal:  Negative for arthralgias, joint swelling and myalgias.   Skin:  Negative for rash.   Neurological:  Negative for dizziness, weakness, headaches and paresthesias.   Psychiatric/Behavioral:  Positive for mood changes. The patient is not nervous/anxious.         OBJECTIVE:   /62 (BP Location: Left arm, Patient Position: Sitting, Cuff Size: Adult Regular)   Pulse 66   Ht 1.575 m (5' 2\")   Wt 63.5 kg (140 lb 1.6 oz)   LMP 11/28/2023 (Exact Date)   SpO2 98%   BMI 25.62 kg/m    Physical Exam  GENERAL: healthy, alert and no distress  EYES: Eyes grossly normal to inspection, PERRL and conjunctivae and sclerae normal  HENT: ear canals and TM's normal, nose and mouth without ulcers or lesions  NECK: no adenopathy, no asymmetry, masses, or scars and thyroid normal to palpation  RESP: lungs clear to auscultation - no rales, rhonchi or wheezes  CV: regular rate and rhythm, normal S1 S2, no S3 or S4, no murmur, click or rub, no peripheral edema and peripheral pulses strong  ABDOMEN: soft, nontender, no hepatosplenomegaly, no masses and bowel sounds normal  MS: no gross musculoskeletal defects noted, no edema  SKIN: no suspicious lesions or rashes  NEURO: Normal strength and tone, mentation intact and speech normal  PSYCH: mentation appears normal, affect normal/bright    Diagnostic Test Results:  Labs reviewed in Epic    ASSESSMENT/PLAN:   Dorita was seen today for physical and depression.    Diagnoses and all orders for this visit:    Routine general medical examination at a health care facility  Doing well; screening labs were done in 2021; declines repeat " until 3 yr laura; maintaining healthy active lifestyle.    Seasonal affective  Mild-moderate. Conservative approaches reviewed; not interested in medication nor counseling at this time. Mindfulness, vitamin supplementation, exercise and Happy lights reviewed.     Counseling for birth control, oral contraceptives  Reviewed range of contraceptive options.  - She elects to continue her current form of birth control: oral contraceptives (estrogen/progesterone).   - Good candidate for this, with no h/o liver or clotting or kidney issues. No migraines w/ aura. Nonsmoker.  - Side effect profile reviewed including effects on spotting, nausea/ headaches, and emotional lability and risk for DVT/VTE.     - Understands annual follow up for med check or sooner if concerns arise    Mild persistent asthma without complication  Allergies  Stable, followed by outside allergy/asthma provider    Other orders  -     REVIEW OF HEALTH MAINTENANCE PROTOCOL ORDERS        Patient has been advised of split billing requirements and indicates understanding: Yes      COUNSELING:  Reviewed preventive health counseling, as reflected in patient instructions        She reports that she has never smoked. She has never used smokeless tobacco.        Geovanna Benton MD  [facility]

## 2023-11-30 NOTE — PATIENT INSTRUCTIONS
"MINDFULNESS    \"Mindfulness is about being fully awake in our lives. It is about perceiving the exquisite vividness of each moment.\"      - Benedict Reina  Mindfulness-Based Stress Reduction (MBSR) is a blend of meditation, body awareness, and yoga:   learning through practice and study how your body handles (and can resolve) stress neurologically.     Mindfulness Resources (all are free):  \"Calm\" naomi- free trial has guided 10min sessions on anxiety, gratitude, sleep, happiness, managing stress, etc.   \"Smiling Minds\" naomi- short guided sessions for children and adults  \"Insight Timer\" naomi- free meditation timer with musical or bell tones, can also stream guided meditations    Free 8 week MBSR program online: [email or web address]/      "

## 2024-10-30 ASSESSMENT — ASTHMA QUESTIONNAIRES
QUESTION_4 LAST FOUR WEEKS HOW OFTEN HAVE YOU USED YOUR RESCUE INHALER OR NEBULIZER MEDICATION (SUCH AS ALBUTEROL): TWO OR THREE TIMES PER WEEK
QUESTION_1 LAST FOUR WEEKS HOW MUCH OF THE TIME DID YOUR ASTHMA KEEP YOU FROM GETTING AS MUCH DONE AT WORK, SCHOOL OR AT HOME: NONE OF THE TIME
ACT_TOTALSCORE: 21
ACT_TOTALSCORE: 21
QUESTION_5 LAST FOUR WEEKS HOW WOULD YOU RATE YOUR ASTHMA CONTROL: WELL CONTROLLED
QUESTION_2 LAST FOUR WEEKS HOW OFTEN HAVE YOU HAD SHORTNESS OF BREATH: ONCE OR TWICE A WEEK
QUESTION_3 LAST FOUR WEEKS HOW OFTEN DID YOUR ASTHMA SYMPTOMS (WHEEZING, COUGHING, SHORTNESS OF BREATH, CHEST TIGHTNESS OR PAIN) WAKE YOU UP AT NIGHT OR EARLIER THAN USUAL IN THE MORNING: NOT AT ALL

## 2024-10-31 ENCOUNTER — OFFICE VISIT (OUTPATIENT)
Dept: FAMILY MEDICINE | Facility: CLINIC | Age: 29
End: 2024-10-31
Payer: COMMERCIAL

## 2024-10-31 VITALS
TEMPERATURE: 97.8 F | SYSTOLIC BLOOD PRESSURE: 112 MMHG | WEIGHT: 145 LBS | HEIGHT: 62 IN | HEART RATE: 68 BPM | BODY MASS INDEX: 26.68 KG/M2 | OXYGEN SATURATION: 98 % | DIASTOLIC BLOOD PRESSURE: 72 MMHG

## 2024-10-31 DIAGNOSIS — Z30.09 COUNSELING FOR BIRTH CONTROL, ORAL CONTRACEPTIVES: ICD-10-CM

## 2024-10-31 DIAGNOSIS — J45.30 MILD PERSISTENT ASTHMA WITHOUT COMPLICATION: ICD-10-CM

## 2024-10-31 DIAGNOSIS — Z12.4 SCREENING FOR CERVICAL CANCER: ICD-10-CM

## 2024-10-31 DIAGNOSIS — H69.93 DYSFUNCTION OF BOTH EUSTACHIAN TUBES: ICD-10-CM

## 2024-10-31 DIAGNOSIS — Q18.1 CYST ON EAR: Primary | ICD-10-CM

## 2024-10-31 PROCEDURE — G0145 SCR C/V CYTO,THINLAYER,RESCR: HCPCS | Performed by: FAMILY MEDICINE

## 2024-10-31 PROCEDURE — G2211 COMPLEX E/M VISIT ADD ON: HCPCS | Performed by: FAMILY MEDICINE

## 2024-10-31 PROCEDURE — 99214 OFFICE O/P EST MOD 30 MIN: CPT | Performed by: FAMILY MEDICINE

## 2024-10-31 RX ORDER — NORGESTIMATE AND ETHINYL ESTRADIOL 0.25-0.035
1 KIT ORAL DAILY
Qty: 84 TABLET | Refills: 3 | Status: SHIPPED | OUTPATIENT
Start: 2024-10-31

## 2024-10-31 NOTE — PROGRESS NOTES
Assessment & Plan     Cyst on ear  Posterior right auricular area notable for small fluctuant pea-sized cystic structure, we reviewed possible etiologies including epidermoid cyst vs small hematoma etc. No posterior chain lymphadenopathy and mastoid is nontender to palpation.  Because the symptoms are improving at this time I recommended continue to monitor for now.  If things worsen We could consider an ultrasound in the area.  It does not palpate consistent with a lymph node at this point.  No preceding illness prior to onset of symptoms.    Dysfunction of both eustachian tubes  Has seen ENT in the past and does have notable eustachian tube retraction on exam today, she is already maximizing Flonase at this point with residual stuffiness.  Recommend reevaluation.  - Adult ENT  Referral; Future    Counseling for birth control, oral contraceptives  Reviewed range of contraceptive options.  - She elects to continue her current form of birth control: oral contraceptives (estrogen/progesterone).   - Good candidate for this, with no h/o liver or clotting or kidney issues. No migraines w/ aura. Nonsmoker.   - Side effect profile reviewed including effects on spotting, nausea/ headaches, and emotional lability and risk for DVT/VTE.     - Understands annual follow up for med check or sooner if concerns arise    - norgestimate-ethinyl estradiol (ESTARYLLA) 0.25-35 MG-MCG tablet; Take 1 tablet by mouth daily.    Screening for cervical cancer  - Pap Screen Reflex to HPV if ASCUS - Recommended Age 25 - 29 Years  - HPV Hold (Lab Only)    Mild persistent asthma without complication  Stable, well controlled      The longitudinal plan of care for the diagnosis(es)/condition(s) as documented were addressed during this visit. Due to the added complexity in care, I will continue to support Dorita in the subsequent management and with ongoing continuity of care.      BMI  Estimated body mass index is 26.52 kg/m  as  "calculated from the following:    Height as of this encounter: 1.575 m (5' 2\").    Weight as of this encounter: 65.8 kg (145 lb).             Conchita Kevin is a 29 year old, presenting for the following health issues:  bump behind ear (Right ear) and Medication Request        10/31/2024    12:40 PM   Additional Questions   Roomed by Celine   Accompanied by self     History of Present Illness       Reason for visit:  Bump behind ear, birth control refill   She is taking medications regularly.     She has a bump behind her right ear for the past 2 weeks.  No preceding illness.  It was not particularly painful.  She had her wedding shortly prior to the onset of symptoms, but she was not wearing any tight head where etc.  It was slowly shrinking in size but she ended up keeping today's appointment just to check-in.    No ear drainage or pain.  No fevers or chills.    Asthma is well-controlled on Symbicort at this time; also on Singulair and Flonase and Allegra and antihistamine nasal spray for allergies.  She does endorse nasal congestion from baseline and has seen ENT in the past and she is thinking she might like to see them back again.    Would like a refill of her birth control.                Objective    /72   Pulse 68   Temp 97.8  F (36.6  C) (Temporal)   Ht 1.575 m (5' 2\")   Wt 65.8 kg (145 lb)   LMP 10/23/2024 (Exact Date)   SpO2 98%   BMI 26.52 kg/m    Body mass index is 26.52 kg/m .  Physical Exam   GENERAL: alert and no distress  EYES: Eyes grossly normal to inspection, PERRL and conjunctivae and sclerae normal  HENT: Posterior right auricular area notable for small fluctuant pea-sized cystic structure which is not especially tender. No posterior chain lymphadenopathy and mastoid is nontender to palpation.  Tms bilaterally are retracted  NECK: no adenopathy, no asymmetry, masses, or scars  RESP: lungs clear to auscultation - no rales, rhonchi or wheezes  CV: regular rate and rhythm, normal " S1 S2, no S3 or S4, no murmur, click or rub, no peripheral edema  ABDOMEN: soft, nontender, no hepatosplenomegaly, no masses and bowel sounds normal   (female) w/bimanual: normal female external genitalia, normal urethral meatus, normal vaginal mucosa, and normal cervix/adnexa/uterus without masses or discharge  MS: no gross musculoskeletal defects noted, no edema  SKIN: no suspicious lesions or rashes  NEURO: Normal strength and tone, mentation intact and speech normal  PSYCH: mentation appears normal, affect normal/bright            Signed Electronically by: Geovanna Benton MD

## 2024-10-31 NOTE — PATIENT INSTRUCTIONS
Consider changing to Nasacort nasal spray   Call your ENT if the ear fluid/fullness isn't resolving

## 2024-11-05 LAB
BKR LAB AP GYN ADEQUACY: NORMAL
BKR LAB AP GYN INTERPRETATION: NORMAL
BKR LAB AP HPV REFLEX: NORMAL
BKR LAB AP LMP: NORMAL
BKR LAB AP PREVIOUS ABNORMAL: NORMAL
PATH REPORT.COMMENTS IMP SPEC: NORMAL
PATH REPORT.COMMENTS IMP SPEC: NORMAL
PATH REPORT.RELEVANT HX SPEC: NORMAL

## 2025-06-03 ENCOUNTER — MYC MEDICAL ADVICE (OUTPATIENT)
Dept: FAMILY MEDICINE | Facility: CLINIC | Age: 30
End: 2025-06-03
Payer: COMMERCIAL

## 2025-06-03 ENCOUNTER — TELEPHONE (OUTPATIENT)
Dept: FAMILY MEDICINE | Facility: CLINIC | Age: 30
End: 2025-06-03
Payer: COMMERCIAL

## 2025-06-03 NOTE — TELEPHONE ENCOUNTER
Patient calls stating her brother and his doctor are requesting that she update her Tdap as she will be visiting his  (due in August).     Tdap is not flagging as due since TD was given in  so technically needs an order approved by [facility] prior to MA appointment.   - last dose of Tdap was given in      Patient would also like to talk to PCP about Tdap immunization. Appointment scheduled with PCP.

## 2025-06-03 NOTE — TELEPHONE ENCOUNTER
Pt requesting provider feedback in regards to getting Tdap vaccination.    According to [facility], the earliest recommended date to receive vaccination is 3/12/2026, however are there exceptions?    Pt has upcoming appointment on 7/25/25    RENATA Linares  [facility]

## 2025-08-26 ENCOUNTER — E-VISIT (OUTPATIENT)
Dept: FAMILY MEDICINE | Facility: CLINIC | Age: 30
End: 2025-08-26
Payer: COMMERCIAL

## 2025-08-26 DIAGNOSIS — N91.2 AMENORRHEA: Primary | ICD-10-CM
